# Patient Record
Sex: MALE | Race: WHITE | NOT HISPANIC OR LATINO | Employment: FULL TIME | URBAN - METROPOLITAN AREA
[De-identification: names, ages, dates, MRNs, and addresses within clinical notes are randomized per-mention and may not be internally consistent; named-entity substitution may affect disease eponyms.]

---

## 2023-02-15 PROBLEM — R30.0 DYSURIA: Status: ACTIVE | Noted: 2023-02-15

## 2023-02-15 PROBLEM — F41.9 ANXIETY: Status: ACTIVE | Noted: 2023-02-15

## 2023-02-15 PROBLEM — R21 RASH: Status: ACTIVE | Noted: 2023-02-15

## 2023-02-15 PROBLEM — F79 INTELLECTUAL DISABILITY: Status: ACTIVE | Noted: 2023-02-15

## 2023-02-15 PROBLEM — H61.23 BILATERAL IMPACTED CERUMEN: Status: ACTIVE | Noted: 2023-02-15

## 2023-02-15 PROBLEM — R35.0 URINARY FREQUENCY: Status: ACTIVE | Noted: 2023-02-15

## 2023-02-15 PROBLEM — G24.01 TARDIVE DYSKINESIA: Status: ACTIVE | Noted: 2023-02-15

## 2023-02-15 PROBLEM — J30.9 ALLERGIC RHINITIS: Status: ACTIVE | Noted: 2023-02-15

## 2023-02-15 PROBLEM — K59.00 CONSTIPATION: Status: ACTIVE | Noted: 2023-02-15

## 2023-02-15 PROBLEM — R91.1 LUNG NODULE: Status: ACTIVE | Noted: 2023-02-15

## 2023-02-15 PROBLEM — R06.02 EXERTIONAL SHORTNESS OF BREATH: Status: ACTIVE | Noted: 2023-02-15

## 2023-02-15 PROBLEM — K21.9 GASTROESOPHAGEAL REFLUX DISEASE WITHOUT ESOPHAGITIS: Status: ACTIVE | Noted: 2023-02-15

## 2023-02-15 PROBLEM — U07.1 COVID-19 VIRUS INFECTION: Status: ACTIVE | Noted: 2023-02-15

## 2023-02-15 PROBLEM — R13.10 DYSPHAGIA: Status: ACTIVE | Noted: 2023-02-15

## 2023-02-15 PROBLEM — R55 SYNCOPE AND COLLAPSE: Status: ACTIVE | Noted: 2023-02-15

## 2023-02-15 PROBLEM — G47.33 OBSTRUCTIVE SLEEP APNEA: Status: ACTIVE | Noted: 2023-02-15

## 2023-02-15 PROBLEM — G21.19 DRUG-INDUCED PARKINSONISM (MULTI): Status: ACTIVE | Noted: 2023-02-15

## 2023-02-15 PROBLEM — G47.00 INSOMNIA: Status: ACTIVE | Noted: 2023-02-15

## 2023-02-15 PROBLEM — E78.5 HYPERLIPIDEMIA: Status: ACTIVE | Noted: 2023-02-15

## 2023-02-15 PROBLEM — F42.9 OBSESSIVE COMPULSIVE DISORDER: Status: ACTIVE | Noted: 2023-02-15

## 2023-02-15 PROBLEM — Q90.9 DOWN SYNDROME (HHS-HCC): Status: ACTIVE | Noted: 2023-02-15

## 2023-02-15 PROBLEM — E03.9 HYPOTHYROIDISM: Status: ACTIVE | Noted: 2023-02-15

## 2023-02-15 PROBLEM — R03.1 BORDERLINE LOW BLOOD PRESSURE DETERMINED BY EXAMINATION: Status: ACTIVE | Noted: 2023-02-15

## 2023-02-15 PROBLEM — G24.3 CERVICAL DYSTONIA: Status: ACTIVE | Noted: 2023-02-15

## 2023-02-15 RX ORDER — LORATADINE 10 MG/1
TABLET ORAL
COMMUNITY
Start: 2021-09-08 | End: 2023-11-09 | Stop reason: WASHOUT

## 2023-02-15 RX ORDER — MAG HYDROX/ALUMINUM HYD/SIMETH 200-200-20
SUSPENSION, ORAL (FINAL DOSE FORM) ORAL
COMMUNITY
Start: 2022-03-21 | End: 2023-11-09 | Stop reason: WASHOUT

## 2023-02-15 RX ORDER — CLONAZEPAM 0.5 MG/1
TABLET ORAL
COMMUNITY
Start: 2022-03-16 | End: 2023-11-09 | Stop reason: SDUPTHER

## 2023-02-15 RX ORDER — VALBENAZINE 60 MG/1
CAPSULE ORAL
COMMUNITY
Start: 2021-07-19 | End: 2023-03-29 | Stop reason: ALTCHOICE

## 2023-02-15 RX ORDER — OMEPRAZOLE 40 MG/1
CAPSULE, DELAYED RELEASE ORAL
COMMUNITY
Start: 2021-07-19 | End: 2023-07-27 | Stop reason: SDUPTHER

## 2023-02-15 RX ORDER — KETOCONAZOLE 20 MG/ML
SHAMPOO, SUSPENSION TOPICAL
COMMUNITY
Start: 2022-03-21

## 2023-02-15 RX ORDER — MELATONIN 5 MG
CAPSULE ORAL
COMMUNITY
Start: 2021-08-07 | End: 2023-05-17 | Stop reason: ALTCHOICE

## 2023-02-15 RX ORDER — LEVOTHYROXINE SODIUM 112 UG/1
TABLET ORAL
COMMUNITY
Start: 2020-02-18 | End: 2023-03-22

## 2023-02-15 RX ORDER — ASPIRIN 81 MG
TABLET, DELAYED RELEASE (ENTERIC COATED) ORAL
COMMUNITY
Start: 2021-08-27 | End: 2023-03-29 | Stop reason: ALTCHOICE

## 2023-02-15 RX ORDER — VIT C/E/ZN/COPPR/LUTEIN/ZEAXAN 250MG-90MG
CAPSULE ORAL
COMMUNITY
Start: 2021-07-19 | End: 2023-07-24

## 2023-02-15 RX ORDER — CARBIDOPA AND LEVODOPA 25; 100 MG/1; MG/1
TABLET ORAL
COMMUNITY
Start: 2021-08-30 | End: 2023-03-29 | Stop reason: ALTCHOICE

## 2023-02-15 RX ORDER — TRIAMCINOLONE ACETONIDE 1 MG/G
CREAM TOPICAL
COMMUNITY
Start: 2021-09-09 | End: 2023-03-29 | Stop reason: ALTCHOICE

## 2023-02-15 RX ORDER — ASPIRIN 81 MG
TABLET, DELAYED RELEASE (ENTERIC COATED) ORAL
COMMUNITY
Start: 2021-08-16 | End: 2023-05-17 | Stop reason: ALTCHOICE

## 2023-02-15 RX ORDER — FAMOTIDINE 20 MG/1
TABLET, FILM COATED ORAL
COMMUNITY
Start: 2021-07-19 | End: 2023-08-10 | Stop reason: SDUPTHER

## 2023-02-15 RX ORDER — FLUOXETINE HYDROCHLORIDE 60 MG/1
TABLET, FILM COATED ORAL; ORAL
COMMUNITY
Start: 2021-07-29 | End: 2023-10-02

## 2023-02-15 RX ORDER — CYANOCOBALAMIN (VITAMIN B-12) 250 MCG
TABLET ORAL
COMMUNITY
Start: 2021-07-19 | End: 2023-05-03

## 2023-03-10 DIAGNOSIS — R13.10 DYSPHAGIA, UNSPECIFIED TYPE: ICD-10-CM

## 2023-03-10 DIAGNOSIS — R53.81 PHYSICAL DECONDITIONING: Primary | ICD-10-CM

## 2023-03-20 DIAGNOSIS — E03.9 HYPOTHYROIDISM, UNSPECIFIED: ICD-10-CM

## 2023-03-22 RX ORDER — LEVOTHYROXINE SODIUM 112 UG/1
TABLET ORAL
Qty: 90 TABLET | Refills: 1 | Status: SHIPPED | OUTPATIENT
Start: 2023-03-22 | End: 2023-03-30

## 2023-03-29 ENCOUNTER — OFFICE VISIT (OUTPATIENT)
Dept: PRIMARY CARE | Facility: CLINIC | Age: 48
End: 2023-03-29
Payer: MEDICARE

## 2023-03-29 ENCOUNTER — LAB (OUTPATIENT)
Dept: LAB | Facility: LAB | Age: 48
End: 2023-03-29
Payer: MEDICARE

## 2023-03-29 VITALS
OXYGEN SATURATION: 99 % | BODY MASS INDEX: 19.91 KG/M2 | SYSTOLIC BLOOD PRESSURE: 80 MMHG | DIASTOLIC BLOOD PRESSURE: 52 MMHG | WEIGHT: 116 LBS | HEART RATE: 65 BPM

## 2023-03-29 DIAGNOSIS — E03.9 HYPOTHYROIDISM, UNSPECIFIED TYPE: ICD-10-CM

## 2023-03-29 DIAGNOSIS — E03.9 HYPOTHYROIDISM, UNSPECIFIED TYPE: Primary | ICD-10-CM

## 2023-03-29 DIAGNOSIS — K59.04 CHRONIC IDIOPATHIC CONSTIPATION: ICD-10-CM

## 2023-03-29 LAB — THYROTROPIN (MIU/L) IN SER/PLAS BY DETECTION LIMIT <= 0.05 MIU/L: 11.27 MIU/L (ref 0.44–3.98)

## 2023-03-29 PROCEDURE — 84443 ASSAY THYROID STIM HORMONE: CPT

## 2023-03-29 PROCEDURE — 3008F BODY MASS INDEX DOCD: CPT | Performed by: INTERNAL MEDICINE

## 2023-03-29 PROCEDURE — 36415 COLL VENOUS BLD VENIPUNCTURE: CPT

## 2023-03-29 PROCEDURE — 99214 OFFICE O/P EST MOD 30 MIN: CPT | Performed by: INTERNAL MEDICINE

## 2023-03-29 RX ORDER — MIDODRINE HYDROCHLORIDE 5 MG/1
10 TABLET ORAL 3 TIMES DAILY
COMMUNITY
End: 2023-11-09 | Stop reason: WASHOUT

## 2023-03-29 RX ORDER — DOCUSATE SODIUM 50 MG/5ML
100 LIQUID ORAL 2 TIMES DAILY
Qty: 600 ML | Refills: 11 | Status: SHIPPED | OUTPATIENT
Start: 2023-03-29 | End: 2023-04-08

## 2023-03-29 NOTE — PROGRESS NOTES
Subjective   Patient ID: Jalen Blackwell is a 47 y.o. male who presents for Follow-up. Check peg wound     HPI   Patient admitted to hospital 2/4-2/10 for aspiration pneumonia. He was treated with Unasyn and evaluated by SLP. He had PEG tube placed and eventually discharged to rehab for about 1 month    He currently has PEG tube in place (gets 3 bolus feeds daily). Eating  softs and honey thick liquids. Continues to work with SLP and PT. Follows with Dr. Gamboa at [facility]. No longer taking Ingrezza or carb/levo. He is getting Botox injections in his neck.       Review of Systems   All other systems reviewed and are negative.      Objective   BP 80/52   Pulse 65   Wt 52.6 kg (116 lb)   SpO2 99%   BMI 19.91 kg/m²     Physical Exam  Constitutional:       Appearance: Normal appearance.      Comments: Developmentally delayed    HENT:      Head: Normocephalic and atraumatic.   Cardiovascular:      Rate and Rhythm: Normal rate and regular rhythm.      Heart sounds: Normal heart sounds. No murmur heard.     No gallop.   Pulmonary:      Effort: Pulmonary effort is normal. No respiratory distress.      Breath sounds: No wheezing or rales.   Skin:     General: Skin is warm and dry.      Findings: No rash.      Comments: PEG tube in place- has erythema around the bumped without any open wounds    Neurological:      Mental Status: He is alert.         Assessment/Plan         #Anxiety   -Following with psych, cont current meds     #Aspiration pneumonia   -now s/p PEG. Cont SLP - diet regimen per their recs     #Chronic hypotension   -Following with Dr. Baker . Cont Midodrine 10mg TID     #GERD  -Sx stable. Recent EGD 7/16/21 with reflux esophagitis (no Barretts on path). Cont PPI BID, famotidine qhs and Gaviscon.     #Tardive dyskinesia, drug induced parkinsonism   -Following with neurology at [facility]     #hypothyroidism   -Cont levothyroxine, check TSH today      #Constipation   -Rx docusate 100mg BID liquid      Influenza:  UTD  COVID: x2   Prevnar 13: Never  Pneumovax 23: Never  Shingrix: Never   CRC: FIT At age 51 per GI   PSA: Never   Lung ca screening: NI   AAA: NI         RTC 6 mo

## 2023-03-30 DIAGNOSIS — E03.9 HYPOTHYROIDISM, UNSPECIFIED TYPE: Primary | ICD-10-CM

## 2023-03-30 RX ORDER — LEVOTHYROXINE SODIUM 125 UG/1
125 TABLET ORAL
Qty: 90 TABLET | Refills: 1 | Status: SHIPPED | OUTPATIENT
Start: 2023-03-30 | End: 2023-06-28

## 2023-04-04 LAB
6-ACETYLMORPHINE: <25 NG/ML
7-AMINOCLONAZEPAM: 654 NG/ML
ALPHA-HYDROXYALPRAZOLAM: <25 NG/ML
ALPHA-HYDROXYMIDAZOLAM: <25 NG/ML
ALPRAZOLAM: <25 NG/ML
AMPHETAMINE (PRESENCE) IN URINE BY SCREEN METHOD: ABNORMAL
BARBITURATES PRESENCE IN URINE BY SCREEN METHOD: ABNORMAL
CANNABINOIDS IN URINE BY SCREEN METHOD: ABNORMAL
CHLORDIAZEPOXIDE: <25 NG/ML
CLONAZEPAM: <25 NG/ML
COCAINE (PRESENCE) IN URINE BY SCREEN METHOD: ABNORMAL
CODEINE: <50 NG/ML
CREATINE, URINE FOR DRUG: 85.4 MG/DL
DIAZEPAM: <25 NG/ML
DRUG SCREEN COMMENT URINE: ABNORMAL
EDDP: <25 NG/ML
FENTANYL CONFIRMATION, URINE: <2.5 NG/ML
HYDROCODONE: <25 NG/ML
HYDROMORPHONE: <25 NG/ML
LORAZEPAM: <25 NG/ML
METHADONE CONFIRMATION,URINE: <25 NG/ML
MIDAZOLAM: <25 NG/ML
MORPHINE URINE: <50 NG/ML
NORDIAZEPAM: <25 NG/ML
NORFENTANYL: <2.5 NG/ML
NORHYDROCODONE: <25 NG/ML
NOROXYCODONE: <25 NG/ML
O-DESMETHYLTRAMADOL: <50 NG/ML
OXAZEPAM: <25 NG/ML
OXYCODONE: <25 NG/ML
OXYMORPHONE: <25 NG/ML
PHENCYCLIDINE (PRESENCE) IN URINE BY SCREEN METHOD: ABNORMAL
TEMAZEPAM: <25 NG/ML
TRAMADOL: <50 NG/ML
ZOLPIDEM METABOLITE (ZCA): <25 NG/ML
ZOLPIDEM: <25 NG/ML

## 2023-04-18 ENCOUNTER — TELEPHONE (OUTPATIENT)
Dept: PRIMARY CARE | Facility: CLINIC | Age: 48
End: 2023-04-18
Payer: MEDICARE

## 2023-04-18 DIAGNOSIS — T14.8XXA OPEN WOUND OF SKIN: Primary | ICD-10-CM

## 2023-04-30 DIAGNOSIS — Z00.00 ENCOUNTER FOR GENERAL ADULT MEDICAL EXAMINATION WITHOUT ABNORMAL FINDINGS: ICD-10-CM

## 2023-05-03 RX ORDER — CYANOCOBALAMIN (VITAMIN B-12) 250 MCG
TABLET ORAL
Qty: 90 TABLET | Refills: 3 | Status: SHIPPED | OUTPATIENT
Start: 2023-05-03 | End: 2024-04-12

## 2023-05-03 RX ORDER — MULTIVITAMIN WITH FOLIC ACID 400 MCG
TABLET ORAL
Qty: 90 EACH | Refills: 3 | Status: SHIPPED | OUTPATIENT
Start: 2023-05-03 | End: 2023-11-09 | Stop reason: WASHOUT

## 2023-05-03 NOTE — TELEPHONE ENCOUNTER
Requested Prescriptions     Pending Prescriptions Disp Refills    Daily-Yovanny, with folic acid, 400 mcg tablet [Pharmacy Med Name: DAILY-YOVANNY TABLET] 90 each 1     Sig: TAKE 1 TABLET BY MOUTH EVERY DAY    cyanocobalamin (Vitamin B-12) 250 mcg tablet [Pharmacy Med Name: VITAMIN B-12 250 MCG TABLET] 90 tablet 1     Sig: TAKE 1 TABLET BY MOUTH EVERY DAY AS DIRECTED

## 2023-05-11 DIAGNOSIS — G24.3 CERVICAL DYSTONIA: ICD-10-CM

## 2023-05-17 ENCOUNTER — OFFICE VISIT (OUTPATIENT)
Dept: PRIMARY CARE | Facility: CLINIC | Age: 48
End: 2023-05-17
Payer: MEDICARE

## 2023-05-17 VITALS
DIASTOLIC BLOOD PRESSURE: 51 MMHG | HEART RATE: 67 BPM | OXYGEN SATURATION: 100 % | SYSTOLIC BLOOD PRESSURE: 96 MMHG | BODY MASS INDEX: 22.26 KG/M2 | HEIGHT: 62 IN | WEIGHT: 121 LBS

## 2023-05-17 DIAGNOSIS — R05.1 ACUTE COUGH: Primary | ICD-10-CM

## 2023-05-17 PROCEDURE — 1036F TOBACCO NON-USER: CPT | Performed by: INTERNAL MEDICINE

## 2023-05-17 PROCEDURE — 3008F BODY MASS INDEX DOCD: CPT | Performed by: INTERNAL MEDICINE

## 2023-05-17 PROCEDURE — 99213 OFFICE O/P EST LOW 20 MIN: CPT | Performed by: INTERNAL MEDICINE

## 2023-05-17 RX ORDER — CETIRIZINE HYDROCHLORIDE 10 MG/1
10 TABLET ORAL DAILY
Qty: 90 TABLET | Refills: 1 | Status: SHIPPED | OUTPATIENT
Start: 2023-05-17 | End: 2023-11-13

## 2023-05-17 RX ORDER — SODIUM CHLORIDE 1 G/1
1 TABLET ORAL 2 TIMES DAILY
COMMUNITY
Start: 2023-05-08 | End: 2023-10-02

## 2023-05-17 RX ORDER — FLUTICASONE PROPIONATE 50 MCG
1 SPRAY, SUSPENSION (ML) NASAL DAILY
Qty: 48 G | Refills: 1 | Status: SHIPPED | OUTPATIENT
Start: 2023-05-17 | End: 2023-07-24 | Stop reason: ALTCHOICE

## 2023-05-17 RX ORDER — DOCUSATE SODIUM 50 MG/5ML
50 LIQUID ORAL DAILY
COMMUNITY
Start: 2023-05-05 | End: 2024-03-29 | Stop reason: SDUPTHER

## 2023-05-17 RX ORDER — CLOBETASOL PROPIONATE 0.46 MG/ML
0.05 SOLUTION TOPICAL 2 TIMES DAILY
COMMUNITY
Start: 2023-04-28

## 2023-05-17 ASSESSMENT — PATIENT HEALTH QUESTIONNAIRE - PHQ9
SUM OF ALL RESPONSES TO PHQ9 QUESTIONS 1 AND 2: 0
1. LITTLE INTEREST OR PLEASURE IN DOING THINGS: NOT AT ALL
2. FEELING DOWN, DEPRESSED OR HOPELESS: NOT AT ALL

## 2023-05-17 NOTE — PROGRESS NOTES
"Subjective   Patient ID: Jalen Blackwell is a 47 y.o. male who presents for No chief complaint on file..    HPI   Reports increasing dry cough in the middle of night x few weeks  Not coughing when he eats   Still get TF TID   Not much runny nose, itchy or watery eyes  GERD Seems well controlled   Has not taken    Review of Systems   HENT:  Positive for hearing loss.        Objective   BP 96/51   Pulse 67   Ht 1.575 m (5' 2\")   Wt 54.9 kg (121 lb)   SpO2 100%   BMI 22.13 kg/m²     Physical Exam  Constitutional:       Appearance: Normal appearance.      Comments: Developmental delay    HENT:      Head: Normocephalic and atraumatic.      Right Ear: Tympanic membrane and ear canal normal.      Left Ear: Tympanic membrane and ear canal normal.   Cardiovascular:      Rate and Rhythm: Normal rate and regular rhythm.      Heart sounds: Normal heart sounds. No murmur heard.     No gallop.   Pulmonary:      Effort: Pulmonary effort is normal. No respiratory distress.      Breath sounds: No wheezing or rales.   Abdominal:      General: Abdomen is flat. There is no distension.      Comments: PEG in place with binder    Skin:     General: Skin is warm and dry.      Findings: No rash.   Neurological:      Mental Status: He is alert and oriented to person, place, and time.         Assessment/Plan       #Cough   -Suspect due to PND/seasonal allergies. Due to h/o aspiration pneumonia will CXR r/o aspiration. GERD seems well controlled.   -Rx cetirizine 10mg nightly and fluticasone nasal spray nightly         "

## 2023-06-26 DIAGNOSIS — E03.9 HYPOTHYROIDISM, UNSPECIFIED TYPE: ICD-10-CM

## 2023-06-28 RX ORDER — LEVOTHYROXINE SODIUM 125 UG/1
125 TABLET ORAL
Qty: 90 TABLET | Refills: 1 | Status: SHIPPED | OUTPATIENT
Start: 2023-06-28 | End: 2024-03-15

## 2023-07-05 ENCOUNTER — TELEPHONE (OUTPATIENT)
Dept: PRIMARY CARE | Facility: CLINIC | Age: 48
End: 2023-07-05

## 2023-07-05 ENCOUNTER — LAB (OUTPATIENT)
Dept: LAB | Facility: LAB | Age: 48
End: 2023-07-05
Payer: MEDICARE

## 2023-07-05 DIAGNOSIS — E03.9 HYPOTHYROIDISM, UNSPECIFIED TYPE: ICD-10-CM

## 2023-07-05 PROCEDURE — 36415 COLL VENOUS BLD VENIPUNCTURE: CPT

## 2023-07-05 PROCEDURE — 84443 ASSAY THYROID STIM HORMONE: CPT

## 2023-07-05 NOTE — TELEPHONE ENCOUNTER
Current Outpatient Medications on File Prior to Visit   Medication Sig Dispense Refill    cetirizine (ZyrTEC) 10 mg tablet Take 1 tablet (10 mg) by mouth once daily. 90 tablet 1    cholecalciferol (Vitamin D-3) 25 MCG (1000 UT) capsule TAKE 1 CAPSULE Daily      clobetasol (Temovate) 0.05 % external solution Apply 0.05 Applications topically 2 times a day.      clonazePAM (KlonoPIN) 0.5 mg tablet Take 1/2 tablet at 7:30am, 1/2 tablet at 2:30pm , and 1/2 tablet at 8:30pm      cyanocobalamin (Vitamin B-12) 250 mcg tablet TAKE 1 TABLET BY MOUTH EVERY DAY AS DIRECTED 90 tablet 3    docusate sodium (Colace) 50 mg/5 mL oral liquid Take 5 mL (50 mg) by mouth once daily.      famotidine (Pepcid) 20 mg tablet TAKE 1 TABLET AT BEDTIME AS NEEDED      FLUoxetine (PROzac) 60 mg tablet       fluticasone (Flonase) 50 mcg/actuation nasal spray Administer 1 spray into each nostril once daily. Shake gently. Before first use, prime pump. After use, clean tip and replace cap. 48 g 1    hydrocortisone 1 % ointment APPLY  AND RUB  IN A THIN FILM TO AFFECTED AREAS TWICE DAILY.(AM AND PM).      ketoconazole (NIZOral) 2 % shampoo APPLY TO SCALP AS DIRECTED.      levothyroxine (Synthroid, Levoxyl) 125 mcg tablet TAKE 1 TABLET (125 MCG) BY MOUTH ONCE DAILY IN THE MORNING. TAKE BEFORE MEALS. 90 tablet 1    loratadine (Claritin) 10 mg tablet TAKE 1 TABLET BY MOUTH AT  BEDTIME prn      midodrine (Proamatine) 5 mg tablet Take 2 tablets (10 mg) by mouth 3 times a day.      multivitamin with folic acid (Daily-Ricardo, with folic acid,) 400 mcg tablet TAKE 1 TABLET BY MOUTH EVERY DAY 90 each 3    omeprazole (PriLOSEC) 40 mg DR capsule TAKE ONE CAPSULE BY MOUTH TWO TIMES A DAY; 30 MINUTES BEFORE BREAKFAST AND DINNER      psyllium (Metamucil) powder Take 2 teaspoons in 8 oz. Liquid daily in the morning- one month supply      sodium chloride 1,000 mg tablet Take 1 tablet (1 g) by mouth 2 times a day.       No current facility-administered medications on  file prior to visit.

## 2023-07-06 LAB — THYROTROPIN (MIU/L) IN SER/PLAS BY DETECTION LIMIT <= 0.05 MIU/L: 1.72 MIU/L (ref 0.44–3.98)

## 2023-07-24 ENCOUNTER — OFFICE VISIT (OUTPATIENT)
Dept: PRIMARY CARE | Facility: CLINIC | Age: 48
End: 2023-07-24
Payer: MEDICARE

## 2023-07-24 VITALS
BODY MASS INDEX: 21.97 KG/M2 | HEART RATE: 62 BPM | OXYGEN SATURATION: 99 % | DIASTOLIC BLOOD PRESSURE: 64 MMHG | SYSTOLIC BLOOD PRESSURE: 105 MMHG | HEIGHT: 63 IN | WEIGHT: 124 LBS

## 2023-07-24 DIAGNOSIS — R79.89 OTHER SPECIFIED ABNORMAL FINDINGS OF BLOOD CHEMISTRY: ICD-10-CM

## 2023-07-24 DIAGNOSIS — G24.01 TARDIVE DYSKINESIA: ICD-10-CM

## 2023-07-24 DIAGNOSIS — E78.5 HYPERLIPIDEMIA, UNSPECIFIED HYPERLIPIDEMIA TYPE: ICD-10-CM

## 2023-07-24 DIAGNOSIS — R26.2 DIFFICULTY IN WALKING: Primary | ICD-10-CM

## 2023-07-24 DIAGNOSIS — R05.1 ACUTE COUGH: ICD-10-CM

## 2023-07-24 PROCEDURE — 99214 OFFICE O/P EST MOD 30 MIN: CPT | Performed by: INTERNAL MEDICINE

## 2023-07-24 PROCEDURE — 3008F BODY MASS INDEX DOCD: CPT | Performed by: INTERNAL MEDICINE

## 2023-07-24 PROCEDURE — 1036F TOBACCO NON-USER: CPT | Performed by: INTERNAL MEDICINE

## 2023-07-24 RX ORDER — VIT C/E/ZN/COPPR/LUTEIN/ZEAXAN 250MG-90MG
25 CAPSULE ORAL DAILY
Qty: 90 CAPSULE | Refills: 1 | Status: SHIPPED | OUTPATIENT
Start: 2023-07-24 | End: 2023-10-02 | Stop reason: ALTCHOICE

## 2023-07-24 RX ORDER — METHYLPREDNISOLONE 4 MG/1
TABLET ORAL
Qty: 21 TABLET | Refills: 0 | Status: SHIPPED | OUTPATIENT
Start: 2023-07-24 | End: 2023-07-31

## 2023-07-24 NOTE — PATIENT INSTRUCTIONS
Please complete fasting blood work. Fast for 10 hours, black coffee and water the morning of labs are OK.     Take medrol pack, if cough does not improve will refer to pulmonology     I will complete wheelchair paperwork

## 2023-07-24 NOTE — PROGRESS NOTES
"Subjective   Patient ID: Jalen Blackwell is a 48 y.o. male who presents for Follow-up (EVALUATION FOR WHEEL CHAIR) and Cough ([id number] ).    HPI   Patient has h/o down syndrome, tardive dyskinesia, cervical dystonia. He has difficulty ambulating due to posture issus. Tends to leans backwards has fallen numerous times. He can not walk or stand for long periods without a wheelchair. Patient's tardive dyskinesia as created instability in his cervical spine requiring neck support. Patient has weakness in b/l LE and b/l UE, has been attending PT with mild improvement. He can not continue using a rollator due to arm weakness and increased fall risk.      Review of Systems   All other systems reviewed and are negative.      Objective   /64   Pulse 62   Ht 1.6 m (5' 3\")   Wt 56.2 kg (124 lb)   SpO2 99%   BMI 21.97 kg/m²     Physical Exam  Constitutional:       Appearance: Normal appearance.      Comments: Development delay    Cardiovascular:      Rate and Rhythm: Normal rate and regular rhythm.      Heart sounds: No murmur heard.  Pulmonary:      Effort: Pulmonary effort is normal.      Breath sounds: Normal breath sounds. No wheezing.   Abdominal:      General: Abdomen is flat.      Palpations: Abdomen is soft.      Tenderness: There is no abdominal tenderness.      Comments: PEG in place    Musculoskeletal:      Right lower leg: No edema.      Left lower leg: No edema.   Skin:     General: Skin is warm and dry.      Findings: No rash.   Neurological:      General: No focal deficit present.      Mental Status: He is alert.      Sensory: No sensory deficit.      Motor: Weakness present.      Comments: B/l UE and LE weakness    Psychiatric:      Comments: Mood and affect are at baseline          Assessment/Plan       Patient has h/o down syndrome, tardive dyskinesia, cervical dystonia. He has difficulty ambulating due to posture issus. Tends to leans backwards has fallen numerous times. He can not walk or stand for " long periods without a wheelchair. Patient's tardive dyskinesia as created instability in his cervical spine requiring neck support. Patient has weakness in b/l LE and b/l UE, has been attending PT with mild improvement. He can not continue using a rollator due to arm weakness and increased fall risk. It is my recommendation that he is provided a wheelchair with neck support to meet his mobility and neck stability needs

## 2023-07-27 DIAGNOSIS — K21.9 GASTROESOPHAGEAL REFLUX DISEASE WITHOUT ESOPHAGITIS: ICD-10-CM

## 2023-07-27 RX ORDER — OMEPRAZOLE 40 MG/1
40 CAPSULE, DELAYED RELEASE ORAL
Qty: 180 CAPSULE | Refills: 1 | Status: SHIPPED | OUTPATIENT
Start: 2023-07-27 | End: 2023-10-02

## 2023-07-27 NOTE — TELEPHONE ENCOUNTER
Requested Prescriptions     Pending Prescriptions Disp Refills    omeprazole (PriLOSEC) 40 mg DR capsule 180 capsule 1     Sig: Take 1 capsule (40 mg) by mouth 2 times a day before meals. Take 30 minutes before breakfast and dinner

## 2023-07-31 ENCOUNTER — DOCUMENTATION (OUTPATIENT)
Dept: PRIMARY CARE | Facility: CLINIC | Age: 48
End: 2023-07-31
Payer: MEDICARE

## 2023-07-31 PROBLEM — M79.674 PAIN IN TOES OF BOTH FEET: Status: ACTIVE | Noted: 2021-05-11

## 2023-07-31 PROBLEM — M79.89 SWELLING OF LOWER EXTREMITY: Status: ACTIVE | Noted: 2023-07-31

## 2023-07-31 PROBLEM — M79.675 PAIN IN TOES OF BOTH FEET: Status: ACTIVE | Noted: 2021-05-11

## 2023-07-31 PROBLEM — B35.1 ONYCHOMYCOSIS: Status: ACTIVE | Noted: 2021-05-11

## 2023-07-31 NOTE — PROGRESS NOTES
Please see problem list.   Patient Active Problem List   Diagnosis    Obsessive compulsive disorder    Allergic rhinitis    Anxiety    Bilateral impacted cerumen    Borderline low blood pressure determined by examination    Cervical dystonia    Constipation    COVID-19 virus infection    Down syndrome    Drug-induced Parkinsonism (CMS/HCC)    Dysphagia    Dysuria    Exertional shortness of breath    Gastroesophageal reflux disease without esophagitis    Hyperlipidemia    Hypothyroidism    Insomnia    Intellectual disability    Lung nodule    Obstructive sleep apnea    Rash    Syncope and collapse    Tardive dyskinesia    Urinary frequency    Impacted cerumen    Moderate episode of recurrent major depressive disorder (CMS/HCC)    Onychomycosis    Pain in toes of both feet    Swelling of lower extremity

## 2023-08-07 DIAGNOSIS — R05.1 ACUTE COUGH: ICD-10-CM

## 2023-08-07 NOTE — PROGRESS NOTES
Orders Placed This Encounter   Procedures    Referral to Pulmonology     Standing Status:   Future     Standing Expiration Date:   2/7/2024     Referral Priority:   Routine     Referral Type:   Consultation     Referral Reason:   Specialty Services Required     Requested Specialty:   Pulmonary Disease     Number of Visits Requested:   1

## 2023-08-10 DIAGNOSIS — K21.9 GASTROESOPHAGEAL REFLUX DISEASE WITHOUT ESOPHAGITIS: ICD-10-CM

## 2023-08-10 RX ORDER — FAMOTIDINE 20 MG/1
TABLET, FILM COATED ORAL
Qty: 90 TABLET | Refills: 1 | Status: SHIPPED | OUTPATIENT
Start: 2023-08-10 | End: 2024-02-04

## 2023-08-10 NOTE — TELEPHONE ENCOUNTER
Requested Prescriptions     Pending Prescriptions Disp Refills    famotidine (Pepcid) 20 mg tablet 90 tablet 1     Sig: TAKE 1 TABLET AT BEDTIME AS NEEDED

## 2023-09-08 PROBLEM — G24.01 TARDIVE DYSTONIA: Status: ACTIVE | Noted: 2023-09-08

## 2023-09-08 PROBLEM — F42.9 OBSESSIVE-COMPULSIVE DISORDER: Status: ACTIVE | Noted: 2023-09-08

## 2023-09-08 PROBLEM — R79.89 LOW VITAMIN D LEVEL: Status: ACTIVE | Noted: 2023-09-08

## 2023-09-08 RX ORDER — TRIAMCINOLONE ACETONIDE 1 MG/G
1 CREAM TOPICAL 2 TIMES DAILY
COMMUNITY
Start: 2021-09-09

## 2023-09-08 RX ORDER — HYDROCORTISONE 25 MG/G
CREAM TOPICAL 2 TIMES DAILY
COMMUNITY
Start: 2023-07-24

## 2023-09-08 RX ORDER — MAG HYDROX/ALUMINUM HYD/SIMETH 200-200-20
1 SUSPENSION, ORAL (FINAL DOSE FORM) ORAL 2 TIMES DAILY
COMMUNITY
Start: 2022-03-21 | End: 2024-01-02 | Stop reason: WASHOUT

## 2023-09-08 RX ORDER — VIT C/E/ZN/COPPR/LUTEIN/ZEAXAN 250MG-90MG
1 CAPSULE ORAL DAILY
COMMUNITY
Start: 2022-12-16 | End: 2023-11-13 | Stop reason: ALTCHOICE

## 2023-09-08 RX ORDER — ERYTHROMYCIN 5 MG/G
OINTMENT OPHTHALMIC
COMMUNITY
Start: 2023-05-26 | End: 2024-01-02 | Stop reason: WASHOUT

## 2023-09-08 RX ORDER — MULTIVITAMIN WITH FOLIC ACID 400 MCG
1 TABLET ORAL DAILY
COMMUNITY
Start: 2022-12-16 | End: 2024-02-07 | Stop reason: SDUPTHER

## 2023-09-08 RX ORDER — LORATADINE 10 MG/1
TABLET ORAL
COMMUNITY
Start: 2021-09-08 | End: 2023-10-02

## 2023-09-08 RX ORDER — FLUOXETINE 20 MG/5ML
5 SOLUTION ORAL DAILY
COMMUNITY
Start: 2023-03-22 | End: 2023-11-09 | Stop reason: ALTCHOICE

## 2023-09-08 RX ORDER — LEVOTHYROXINE SODIUM 112 UG/1
1 TABLET ORAL DAILY
COMMUNITY
Start: 2020-02-18 | End: 2023-11-09 | Stop reason: WASHOUT

## 2023-09-08 RX ORDER — OMEPRAZOLE 40 MG/1
1 CAPSULE, DELAYED RELEASE ORAL DAILY
COMMUNITY
Start: 2021-07-19 | End: 2024-02-09 | Stop reason: SDUPTHER

## 2023-09-08 RX ORDER — VALBENAZINE 80 MG/1
CAPSULE ORAL
COMMUNITY
Start: 2023-01-23 | End: 2023-11-09 | Stop reason: WASHOUT

## 2023-09-08 RX ORDER — DOCUSATE SODIUM 100 MG/1
CAPSULE, LIQUID FILLED ORAL
COMMUNITY
Start: 2021-07-19 | End: 2023-10-02 | Stop reason: ALTCHOICE

## 2023-09-08 RX ORDER — SODIUM CHLORIDE 1000 MG
1 TABLET, SOLUBLE MISCELLANEOUS 2 TIMES DAILY
COMMUNITY
Start: 2023-04-04

## 2023-09-08 RX ORDER — ACETAMINOPHEN 500 MG
1 TABLET ORAL NIGHTLY
COMMUNITY
Start: 2022-08-05

## 2023-09-08 RX ORDER — FAMOTIDINE 20 MG/1
1 TABLET, FILM COATED ORAL NIGHTLY PRN
COMMUNITY
Start: 2021-07-19 | End: 2023-10-02 | Stop reason: ALTCHOICE

## 2023-09-08 RX ORDER — MIDODRINE HYDROCHLORIDE 10 MG/1
1 TABLET ORAL 3 TIMES DAILY
COMMUNITY
Start: 2023-03-28

## 2023-09-08 RX ORDER — FLUOXETINE HYDROCHLORIDE 40 MG/1
2 CAPSULE ORAL DAILY
COMMUNITY
Start: 2023-01-31 | End: 2023-11-09 | Stop reason: WASHOUT

## 2023-09-08 RX ORDER — CLONAZEPAM 0.5 MG/1
TABLET ORAL
COMMUNITY
Start: 2022-03-16 | End: 2023-10-02 | Stop reason: ALTCHOICE

## 2023-09-08 RX ORDER — CYANOCOBALAMIN (VITAMIN B-12) 250 MCG
1 TABLET ORAL DAILY
COMMUNITY
Start: 2021-11-10 | End: 2023-10-02 | Stop reason: ALTCHOICE

## 2023-10-02 ENCOUNTER — OFFICE VISIT (OUTPATIENT)
Dept: PRIMARY CARE | Facility: CLINIC | Age: 48
End: 2023-10-02
Payer: MEDICARE

## 2023-10-02 VITALS
DIASTOLIC BLOOD PRESSURE: 51 MMHG | SYSTOLIC BLOOD PRESSURE: 78 MMHG | HEIGHT: 62 IN | BODY MASS INDEX: 22.45 KG/M2 | OXYGEN SATURATION: 97 % | WEIGHT: 122 LBS | HEART RATE: 84 BPM

## 2023-10-02 DIAGNOSIS — Z23 NEED FOR INFLUENZA VACCINATION: ICD-10-CM

## 2023-10-02 DIAGNOSIS — I95.9 HYPOTENSION, UNSPECIFIED HYPOTENSION TYPE: ICD-10-CM

## 2023-10-02 DIAGNOSIS — E78.5 HYPERLIPIDEMIA, UNSPECIFIED HYPERLIPIDEMIA TYPE: ICD-10-CM

## 2023-10-02 DIAGNOSIS — Z00.00 ROUTINE GENERAL MEDICAL EXAMINATION AT HEALTH CARE FACILITY: Primary | ICD-10-CM

## 2023-10-02 DIAGNOSIS — Z12.5 SCREENING FOR PROSTATE CANCER: ICD-10-CM

## 2023-10-02 PROCEDURE — 99214 OFFICE O/P EST MOD 30 MIN: CPT | Performed by: INTERNAL MEDICINE

## 2023-10-02 PROCEDURE — 1036F TOBACCO NON-USER: CPT | Performed by: INTERNAL MEDICINE

## 2023-10-02 PROCEDURE — 90686 IIV4 VACC NO PRSV 0.5 ML IM: CPT | Performed by: INTERNAL MEDICINE

## 2023-10-02 PROCEDURE — G0008 ADMIN INFLUENZA VIRUS VAC: HCPCS | Performed by: INTERNAL MEDICINE

## 2023-10-02 PROCEDURE — 3008F BODY MASS INDEX DOCD: CPT | Performed by: INTERNAL MEDICINE

## 2023-10-02 PROCEDURE — G0439 PPPS, SUBSEQ VISIT: HCPCS | Performed by: INTERNAL MEDICINE

## 2023-10-02 ASSESSMENT — ACTIVITIES OF DAILY LIVING (ADL)
BATHING: NEEDS ASSISTANCE
MANAGING_FINANCES: TOTAL CARE
DOING_HOUSEWORK: TOTAL CARE
GROCERY_SHOPPING: TOTAL CARE
TAKING_MEDICATION: TOTAL CARE
DRESSING: NEEDS ASSISTANCE

## 2023-10-02 ASSESSMENT — PATIENT HEALTH QUESTIONNAIRE - PHQ9
2. FEELING DOWN, DEPRESSED OR HOPELESS: NOT AT ALL
1. LITTLE INTEREST OR PLEASURE IN DOING THINGS: NOT AT ALL
SUM OF ALL RESPONSES TO PHQ9 QUESTIONS 1 AND 2: 0

## 2023-10-02 NOTE — PROGRESS NOTES
Subjective   Patient ID: Jalen Blackwell is a 48 y.o. male who presents for Medicare Annual Wellness Visit Subsequent.    HPI     Continues to work with SLP to prevent aspiration, goal is to eventually remove the PEG.     BP is usually higher than today. Is taking midodrine and NaCl.     No other new issues     Review of Systems   All other systems reviewed and are negative.      Objective   There were no vitals taken for this visit.    Physical Exam  Constitutional:       Appearance: Normal appearance.      Comments: Has developmental delay    HENT:      Head: Normocephalic and atraumatic.   Cardiovascular:      Rate and Rhythm: Normal rate and regular rhythm.      Heart sounds: Normal heart sounds. No murmur heard.     No gallop.   Pulmonary:      Effort: Pulmonary effort is normal. No respiratory distress.      Breath sounds: No wheezing or rales.   Skin:     General: Skin is warm and dry.      Findings: No rash.   Neurological:      Mental Status: He is alert. Mental status is at baseline.   Psychiatric:         Mood and Affect: Mood normal.         Behavior: Behavior normal.      Comments: Has flat affect          Assessment/Plan       #Anxiety   -Following with psych, cont current meds      #Aspiration pneumonia   -now s/p PEG. Cont SLP - diet regimen per their recs      #Chronic hypotension   -Following with Dr. Baker . Cont Midodrine 10mg TID  -Check AM cortisol and DHEAS for possible adrenal insufficiency      #GERD  -Sx stable. Recent EGD 7/16/21 with reflux esophagitis (no Barretts on path). Cont PPI BID, famotidine qhs and Gaviscon.     #Tardive dyskinesia, drug induced parkinsonism   -Following with neurology at [facility]     #hypothyroidism   -Cont levothyroxine     #Constipation   -Rx docusate 100mg BID liquid      Influenza: 10/2/2023  COVID: x2   Prevnar 13: Never  Pneumovax 23: Never  Shingrix: Never   CRC: FIT At age 51 per GI   PSA: Order today due to family history of prostate ca   Lung ca screening:  NI   AAA: NI         RTC 6 mo

## 2023-10-02 NOTE — PATIENT INSTRUCTIONS
Get labs within 3 hours of waking up --fast for 10 hours please  No med changes     Come back in 6 months

## 2023-10-04 ENCOUNTER — LAB (OUTPATIENT)
Dept: LAB | Facility: LAB | Age: 48
End: 2023-10-04
Payer: MEDICARE

## 2023-10-04 DIAGNOSIS — I95.9 HYPOTENSION, UNSPECIFIED HYPOTENSION TYPE: ICD-10-CM

## 2023-10-04 DIAGNOSIS — Z12.5 SCREENING FOR PROSTATE CANCER: ICD-10-CM

## 2023-10-04 DIAGNOSIS — E03.9 HYPOTHYROIDISM, UNSPECIFIED TYPE: ICD-10-CM

## 2023-10-04 DIAGNOSIS — E78.5 HYPERLIPIDEMIA, UNSPECIFIED HYPERLIPIDEMIA TYPE: ICD-10-CM

## 2023-10-04 LAB
ALBUMIN SERPL BCP-MCNC: 3.7 G/DL (ref 3.4–5)
ALP SERPL-CCNC: 51 U/L (ref 33–120)
ALT SERPL W P-5'-P-CCNC: 17 U/L (ref 10–52)
ANION GAP SERPL CALC-SCNC: 13 MMOL/L (ref 10–20)
AST SERPL W P-5'-P-CCNC: 19 U/L (ref 9–39)
BILIRUB SERPL-MCNC: 0.5 MG/DL (ref 0–1.2)
BUN SERPL-MCNC: 11 MG/DL (ref 6–23)
CALCIUM SERPL-MCNC: 9.1 MG/DL (ref 8.6–10.6)
CHLORIDE SERPL-SCNC: 104 MMOL/L (ref 98–107)
CHOLEST SERPL-MCNC: 181 MG/DL (ref 0–199)
CHOLESTEROL/HDL RATIO: 4.8
CO2 SERPL-SCNC: 29 MMOL/L (ref 21–32)
CORTIS AM PEAK SERPL-MSCNC: 9.7 UG/DL (ref 5–20)
CREAT SERPL-MCNC: 0.87 MG/DL (ref 0.5–1.3)
DHEA-S SERPL-MCNC: 111 UG/DL (ref 95–530)
ERYTHROCYTE [DISTWIDTH] IN BLOOD BY AUTOMATED COUNT: 12.1 % (ref 11.5–14.5)
GFR SERPL CREATININE-BSD FRML MDRD: >90 ML/MIN/1.73M*2
GLUCOSE SERPL-MCNC: 103 MG/DL (ref 74–99)
HCT VFR BLD AUTO: 43.8 % (ref 41–52)
HDLC SERPL-MCNC: 37.4 MG/DL
HGB BLD-MCNC: 15 G/DL (ref 13.5–17.5)
LDLC SERPL CALC-MCNC: 117 MG/DL (ref 140–190)
MCH RBC QN AUTO: 34.1 PG (ref 26–34)
MCHC RBC AUTO-ENTMCNC: 34.2 G/DL (ref 32–36)
MCV RBC AUTO: 100 FL (ref 80–100)
NON HDL CHOLESTEROL: 144 MG/DL (ref 0–149)
NRBC BLD-RTO: 0 /100 WBCS (ref 0–0)
PLATELET # BLD AUTO: 164 X10*3/UL (ref 150–450)
PMV BLD AUTO: 10.5 FL (ref 7.5–11.5)
POTASSIUM SERPL-SCNC: 3.9 MMOL/L (ref 3.5–5.3)
PROT SERPL-MCNC: 6.7 G/DL (ref 6.4–8.2)
PSA SERPL-MCNC: 0.28 NG/ML
RBC # BLD AUTO: 4.4 X10*6/UL (ref 4.5–5.9)
SODIUM SERPL-SCNC: 142 MMOL/L (ref 136–145)
TRIGL SERPL-MCNC: 135 MG/DL (ref 0–149)
TSH SERPL-ACNC: 3.26 MIU/L (ref 0.44–3.98)
VLDL: 27 MG/DL (ref 0–40)
WBC # BLD AUTO: 4.2 X10*3/UL (ref 4.4–11.3)

## 2023-10-04 PROCEDURE — 36415 COLL VENOUS BLD VENIPUNCTURE: CPT

## 2023-10-09 ENCOUNTER — OFFICE VISIT (OUTPATIENT)
Dept: WOUND CARE | Facility: HOSPITAL | Age: 48
End: 2023-10-09
Payer: MEDICARE

## 2023-10-09 ENCOUNTER — TELEPHONE (OUTPATIENT)
Dept: PRIMARY CARE | Facility: CLINIC | Age: 48
End: 2023-10-09

## 2023-10-09 PROCEDURE — 17250 CHEM CAUT OF GRANLTJ TISSUE: CPT | Mod: PO

## 2023-10-16 DIAGNOSIS — R13.12 OROPHARYNGEAL DYSPHAGIA: Primary | ICD-10-CM

## 2023-10-23 ENCOUNTER — CLINICAL SUPPORT (OUTPATIENT)
Dept: WOUND CARE | Facility: HOSPITAL | Age: 48
End: 2023-10-23
Payer: MEDICARE

## 2023-10-23 PROCEDURE — 17250 CHEM CAUT OF GRANLTJ TISSUE: CPT | Mod: PO

## 2023-11-01 ENCOUNTER — LAB (OUTPATIENT)
Dept: LAB | Facility: LAB | Age: 48
End: 2023-11-01
Payer: MEDICARE

## 2023-11-01 DIAGNOSIS — R32 URINARY INCONTINENCE, UNSPECIFIED TYPE: ICD-10-CM

## 2023-11-01 PROCEDURE — 87086 URINE CULTURE/COLONY COUNT: CPT

## 2023-11-01 PROCEDURE — 81003 URINALYSIS AUTO W/O SCOPE: CPT

## 2023-11-01 NOTE — PROGRESS NOTES
Mother called   Jalen is experiencing night time urinary incontinence concerned for uti.  Ordered urine test. Next steps after we get results

## 2023-11-02 LAB
APPEARANCE UR: CLEAR
BILIRUB UR STRIP.AUTO-MCNC: NEGATIVE MG/DL
COLOR UR: YELLOW
GLUCOSE UR STRIP.AUTO-MCNC: NEGATIVE MG/DL
KETONES UR STRIP.AUTO-MCNC: NEGATIVE MG/DL
LEUKOCYTE ESTERASE UR QL STRIP.AUTO: NEGATIVE
NITRITE UR QL STRIP.AUTO: NEGATIVE
PH UR STRIP.AUTO: 6 [PH]
PROT UR STRIP.AUTO-MCNC: NEGATIVE MG/DL
RBC # UR STRIP.AUTO: NEGATIVE /UL
SP GR UR STRIP.AUTO: 1.01
UROBILINOGEN UR STRIP.AUTO-MCNC: <2 MG/DL

## 2023-11-03 LAB — BACTERIA UR CULT: NO GROWTH

## 2023-11-06 ENCOUNTER — CLINICAL SUPPORT (OUTPATIENT)
Dept: WOUND CARE | Facility: HOSPITAL | Age: 48
End: 2023-11-06
Payer: MEDICARE

## 2023-11-06 PROCEDURE — 17250 CHEM CAUT OF GRANLTJ TISSUE: CPT | Mod: PO

## 2023-11-07 ENCOUNTER — APPOINTMENT (OUTPATIENT)
Dept: SPEECH THERAPY | Facility: HOSPITAL | Age: 48
End: 2023-11-07
Payer: MEDICARE

## 2023-11-09 ENCOUNTER — TELEMEDICINE (OUTPATIENT)
Dept: BEHAVIORAL HEALTH | Facility: CLINIC | Age: 48
End: 2023-11-09
Payer: MEDICARE

## 2023-11-09 DIAGNOSIS — G24.3 CERVICAL DYSTONIA: ICD-10-CM

## 2023-11-09 DIAGNOSIS — G47.00 INSOMNIA, UNSPECIFIED TYPE: ICD-10-CM

## 2023-11-09 DIAGNOSIS — F79 INTELLECTUAL DISABILITY: ICD-10-CM

## 2023-11-09 DIAGNOSIS — Q90.9 DOWN SYNDROME (HHS-HCC): ICD-10-CM

## 2023-11-09 DIAGNOSIS — G24.01 TARDIVE DYSKINESIA: ICD-10-CM

## 2023-11-09 DIAGNOSIS — G24.01 TARDIVE DYSTONIA: ICD-10-CM

## 2023-11-09 DIAGNOSIS — F41.9 ANXIETY: Primary | ICD-10-CM

## 2023-11-09 PROBLEM — F42.9 OBSESSIVE-COMPULSIVE DISORDER: Status: RESOLVED | Noted: 2023-09-08 | Resolved: 2023-11-09

## 2023-11-09 PROBLEM — R21 RASH: Status: RESOLVED | Noted: 2023-02-15 | Resolved: 2023-11-09

## 2023-11-09 PROBLEM — G21.19 DRUG-INDUCED PARKINSONISM (MULTI): Status: RESOLVED | Noted: 2023-02-15 | Resolved: 2023-11-09

## 2023-11-09 PROBLEM — R06.02 EXERTIONAL SHORTNESS OF BREATH: Status: RESOLVED | Noted: 2023-02-15 | Resolved: 2023-11-09

## 2023-11-09 PROBLEM — U07.1 COVID-19 VIRUS INFECTION: Status: RESOLVED | Noted: 2023-02-15 | Resolved: 2023-11-09

## 2023-11-09 PROBLEM — R30.0 DYSURIA: Status: RESOLVED | Noted: 2023-02-15 | Resolved: 2023-11-09

## 2023-11-09 PROBLEM — F42.9 OBSESSIVE COMPULSIVE DISORDER: Status: RESOLVED | Noted: 2023-02-15 | Resolved: 2023-11-09

## 2023-11-09 PROCEDURE — 99215 OFFICE O/P EST HI 40 MIN: CPT | Performed by: PSYCHIATRY & NEUROLOGY

## 2023-11-09 RX ORDER — CLONAZEPAM 0.5 MG/1
TABLET ORAL
Qty: 90 TABLET | Refills: 2 | Status: SHIPPED | OUTPATIENT
Start: 2023-11-09 | End: 2024-02-08 | Stop reason: SDUPTHER

## 2023-11-09 ASSESSMENT — ENCOUNTER SYMPTOMS
CHOKING: 0
ABDOMINAL PAIN: 0
VOMITING: 0
SEIZURES: 0
ACTIVITY CHANGE: 0
TROUBLE SWALLOWING: 1
DIARRHEA: 0
SLEEP DISTURBANCE: 0
CONSTIPATION: 0

## 2023-11-09 NOTE — PROGRESS NOTES
Outpatient Psychiatry    A HIPAA-compliant interactive audio and video telecommunication system which permits real time communications between the patient (at the originating site) and provider (at the distant site) was utilized to provide this telehealth service.     The patient, family, caregivers and guardian (as appropriate) have provided consent on this date to conduct treatment via this telehealth service.  The patient's identity and physical location were verified at the time of this visit.      Present for appointment: Jalen and mom/guardian (Jael).    SUBJECTIVE    Jalen has continued to make some slow/steady progress since we last met.  He is doing better with swallowing thin liquids and has been able to replace on of his tube feeds with a protein shake.  He is still anxious about the possibility of aspirating, but seems more willing to continue trying to expand his diet.  He had Botox treatment #3 on 10/18/23.  He has a new independent provider that will be starting soon.  He went to a [employer] dance at the [facility] and had a good time.  He has a new custom wheelchair.  His overall mood/disposition and anxiety have remained quite stable since decreasing his fluoxetine down to 20mg/day at our last visit.  He continues to take clonazepam.     Review of Systems   Constitutional:  Negative for activity change.   HENT:  Positive for trouble swallowing.    Respiratory:  Negative for choking.    Gastrointestinal:  Negative for abdominal pain, constipation, diarrhea and vomiting.   Musculoskeletal:  Positive for gait problem.   Neurological:  Negative for seizures.   Psychiatric/Behavioral:  Negative for behavioral problems and sleep disturbance.       Controlled Substance Evaluation  OARRS/PDMP reviewed: Pancho Garcia MD on 11/9/2023  3:45 PM  Is the patient prescribed a combination of a benzodiazepine and opioid? No  I have personally reviewed the OARRS report for Jalen Blackwell.   I  have considered the risks of abuse, dependence, addiction and diversion.    I believe that it is clinically appropriate for Jalen Blackwell to be prescribed this medication.    Last Urine Drug Screen:  Recent Results (from the past 8760 hour(s))   OPIATE/OPIOID/BENZO PRESCRIPTION COMPLIANCE    Collection Time: 03/29/23  1:24 PM   Result Value Ref Range    DRUG SCREEN COMMENT URINE SEE BELOW     Creatine, Urine 85.4 mg/dL    Amphetamine Screen, Urine PRESUMPTIVE NEGATIVE NEGATIVE    Barbiturate Screen, Urine PRESUMPTIVE NEGATIVE NEGATIVE    Cannabinoid Screen, Urine PRESUMPTIVE NEGATIVE NEGATIVE    Cocaine Screen, Urine PRESUMPTIVE NEGATIVE NEGATIVE    PCP Screen, Urine PRESUMPTIVE NEGATIVE NEGATIVE    7-Aminoclonazepam 654 (A) Cutoff <25 ng/mL    Alpha-Hydroxyalprazolam <25 Cutoff <25 ng/mL    Alpha-Hydroxymidazolam <25 Cutoff <25 ng/mL    Alprazolam <25 Cutoff <25 ng/mL    Chlordiazepoxide <25 Cutoff <25 ng/mL    Clonazepam <25 Cutoff <25 ng/mL    Diazepam <25 Cutoff <25 ng/mL    Lorazepam <25 Cutoff <25 ng/mL    Midazolam <25 Cutoff <25 ng/mL    Nordiazepam <25 Cutoff <25 ng/mL    Oxazepam <25 Cutoff <25 ng/mL    Temazepam <25 Cutoff <25 ng/mL    Zolpidem <25 Cutoff <25 ng/mL    Zolpidem Metabolite (ZCA) <25 Cutoff <25 ng/mL    6-Acetylmorphine <25 Cutoff <25 ng/mL    Codeine <50 Cutoff <50 ng/mL    Hydrocodone <25 Cutoff <25 ng/mL    Hydromorphone <25 Cutoff <25 ng/mL    Morphine Urine <50 Cutoff <50 ng/mL    Norhydrocodone <25 Cutoff <25 ng/mL    Noroxycodone <25 Cutoff <25 ng/mL    Oxycodone <25 Cutoff <25 ng/mL    Oxymorphone <25 Cutoff <25 ng/mL    Tramadol <50 Cutoff <50 ng/mL    O-Desmethyltramadol <50 Cutoff <50 ng/mL    Fentanyl <2.5 Cutoff<2.5 ng/mL    Norfentanyl <2.5 Cutoff<2.5 ng/mL    METHADONE CONFIRMATION,URINE <25 Cutoff <25 ng/mL    EDDP <25 Cutoff <25 ng/mL     Results as expected? Yes    Controlled Substance Agreement: 03/24/2023  Reviewed Controlled Substance Agreement, including but not  limited to:  Benefits, risks, and alternatives to treatment with a controlled substance medication(s).    Prescribed Controlled Substances:  Benzodiazepines: clonazepam  What is the patient's goal of therapy? Reduced anxiety and spasticity  Is this being achieved with current treatment? Yes  Activities of Daily Living:   Is your overall impression that this patient is benefiting (symptom reduction outweighs side effects) from benzodiazepine therapy? Yes   1. Physical Functioning: Same  2. Family Relationship: Same  3. Social Relationship: Same  4. Mood: Same  5. Sleep Patterns: Same  6. Overall Function: Same     MEDICATION HISTORY  Aripiprazole  Brexpiprazole - caused TD  Paroxetine - up to 60mg per day  Ingrezza  Sinemet - to address Parkinsonian effects of VMAT2 inhibitor    CURRENT MEDICATIONS    Current Outpatient Medications:     cetirizine (ZyrTEC) 10 mg tablet, Take 1 tablet (10 mg) by mouth once daily., Disp: 90 tablet, Rfl: 1    cholecalciferol (Vitamin D-3) 25 MCG (1000 UT) capsule, Take 1 capsule (25 mcg) by mouth once daily., Disp: , Rfl:     clobetasol (Temovate) 0.05 % external solution, Apply 0.05 Applications topically 2 times a day., Disp: , Rfl:     clonazePAM (KlonoPIN) 0.5 mg tablet, Take 1/2 tablet at 7:30am, 1/2 tablet at 2:30pm , and 1/2 tablet at 8:30pm, Disp: , Rfl:     cyanocobalamin (Vitamin B-12) 250 mcg tablet, TAKE 1 TABLET BY MOUTH EVERY DAY AS DIRECTED, Disp: 90 tablet, Rfl: 3    docusate sodium (Colace) 50 mg/5 mL oral liquid, Take 5 mL (50 mg) by mouth once daily., Disp: , Rfl:     erythromycin (Romycin) 5 mg/gram (0.5 %) ophthalmic ointment, 1/2 INCH STRIP IN AFFECTED EYE 4 TIMES A DAY, FOR 7 DAYS., Disp: , Rfl:     famotidine (Pepcid) 20 mg tablet, TAKE 1 TABLET AT BEDTIME AS NEEDED, Disp: 90 tablet, Rfl: 1    FLUoxetine (PROzac) 20 mg/5 mL (4 mg/mL) solution, Take 5 mL (20 mg) by mouth once daily., Disp: , Rfl:     hydrocortisone 1 % ointment, Apply 1 Film topically 2 times a  day., Disp: , Rfl:     hydrocortisone 2.5 % cream, Apply topically 2 times a day. to affected area, Disp: , Rfl:     ketoconazole (NIZOral) 2 % shampoo, APPLY TO SCALP AS DIRECTED., Disp: , Rfl:     levothyroxine (Synthroid, Levoxyl) 125 mcg tablet, TAKE 1 TABLET (125 MCG) BY MOUTH ONCE DAILY IN THE MORNING. TAKE BEFORE MEALS., Disp: 90 tablet, Rfl: 1    melatonin 5 mg tablet, Take 1 tablet (5 mg) by mouth once daily at bedtime., Disp: , Rfl:     midodrine (Proamatine) 10 mg tablet, Take 1 tablet (10 mg) by mouth 3 times a day., Disp: , Rfl:     multivitamin (Daily-Ricardo, with folic acid,) tablet, Take 1 tablet by mouth once daily., Disp: , Rfl:     omeprazole (PriLOSEC) 40 mg DR capsule, Take 1 capsule (40 mg) by mouth once daily., Disp: , Rfl:     psyllium (Metamucil) powder, Take 2 teaspoons in 8 oz. Liquid daily in the morning- one month supply, Disp: , Rfl:     sodium chloride 1,000 mg tablet, Take 1 tablet (1 g) by mouth 2 times a day., Disp: , Rfl:     triamcinolone (Kenalog) 0.1 % cream, Apply 1 Film topically 2 times a day., Disp: , Rfl:     SOCIAL HISTORY  Living situation Lives with family   Provider agency None currently   Work or day program None currently   School [employer]   Guardianship Mother (Jael)   SSA ?   Bx Specialist ?   Nicotine None   Alcohol None   Other drugs None     OBJECTIVE    Lab Results   Component Value Date    HGB 15.0 10/04/2023     10/04/2023    NEUTROABS 7.33 02/04/2023    GLUCOSE 103 (H) 10/04/2023     10/04/2023    K 3.9 10/04/2023    CO2 29 10/04/2023    CALCIUM 9.1 10/04/2023    CREATININE 0.87 10/04/2023    AST 19 10/04/2023    ALT 17 10/04/2023    AMMONIA 29 07/06/2021    TSH 3.26 10/04/2023    CHOL 181 10/04/2023    LDLF 127 (H) 01/12/2018    TRIG 135 10/04/2023    [id number] 667 06/17/2022     Therapeutic Drug Monitoring  N/A    Electrocardiograms  07/07/2021: NSR, VR 80, QTc 426    MENTAL STATUS EXAM  General/Appearance: Appropriate  dress/hygiene/grooming.  Attitude/Behavior: Actively engages with interview.  Speech/Communication: Normal volume/rate/tone. Some stuttering.  Motor: Involuntary trunk and/or limb movements.  Gait: Not assessed at this visit.  Mood: Neutral.  Affect: Neutral.  Thought processes: [place].  Thought content: Future-oriented.  Perception: Does not appear to be experiencing or responding to hallucinations.  Sensorium/Cognition: Oriented to self and surroundings. Intellectual impairment.  Memory: Recent & remote recall is intact.  Insight: Minimal.  Judgment: Good. Adherent with prescribed/recommended treatment(s).    ASSESSMENT  Jalen has continued to do well since our last visit.  He has managed to try some new foods/liquids despite being very anxious about the possibility of having another choking event.  He still remains mostly or at least partially g-tube dependent for nutrition.  TD symptoms are mostly unchanged.  I think it would be reasonable to proceed with the final stage of tapering off of fluoxetine in the event that it is contributing to his TD symptoms, while monitoring his overall and mood and anxiety.    PROBLEM LIST  Intellectual developmental disorder, mild, secondary to Down syndrome  Anxiety  Tardive dyskinesia    PLAN  -- Decrease fluoxetine liquid to 10mg daily for 4 weeks then stop  -- Continue clonazepam 0.25mg - 0.5mg - 0.5mg for anxiety and dystonia  -- Follow up 3 months or sooner if needed    Pancho Garcia MD    Prep time on date of the patient encounter: 10 minutes   Time spent directly with patient/family/caregiver: 35 minutes   Additional time spent on patient care activities: 0 minutes   Documentation time: 10 minutes   Other time spent: 0 minutes   Total time on date of patient encounter: 55 minutes

## 2023-11-10 DIAGNOSIS — R05.1 ACUTE COUGH: ICD-10-CM

## 2023-11-10 DIAGNOSIS — R79.89 LOW VITAMIN D LEVEL: Primary | ICD-10-CM

## 2023-11-13 RX ORDER — CETIRIZINE HYDROCHLORIDE 10 MG/1
10 TABLET ORAL DAILY
Qty: 90 TABLET | Refills: 2 | Status: SHIPPED | OUTPATIENT
Start: 2023-11-13 | End: 2024-01-02 | Stop reason: WASHOUT

## 2023-11-13 RX ORDER — CHOLECALCIFEROL (VITAMIN D3) 25 MCG
1000 TABLET ORAL DAILY
Qty: 90 TABLET | Refills: 3 | Status: SHIPPED | OUTPATIENT
Start: 2023-11-13 | End: 2024-11-12

## 2023-11-13 NOTE — TELEPHONE ENCOUNTER
Requested Prescriptions     Pending Prescriptions Disp Refills    cetirizine (ZyrTEC) 10 mg tablet [Pharmacy Med Name: CETIRIZINE HCL 10 MG TABLET] 90 tablet 2     Sig: TAKE 1 TABLET BY MOUTH EVERY DAY    cholecalciferol (Vitamin D3) 25 MCG (1000 UT) tablet 90 tablet 3     Sig: Take 1 tablet (1,000 Units) by mouth once daily.

## 2023-11-20 ENCOUNTER — CLINICAL SUPPORT (OUTPATIENT)
Dept: WOUND CARE | Facility: HOSPITAL | Age: 48
End: 2023-11-20
Payer: MEDICARE

## 2023-11-20 PROCEDURE — 17250 CHEM CAUT OF GRANLTJ TISSUE: CPT | Mod: PO

## 2023-11-30 ENCOUNTER — APPOINTMENT (OUTPATIENT)
Dept: SPEECH THERAPY | Facility: HOSPITAL | Age: 48
End: 2023-11-30
Payer: MEDICARE

## 2023-12-04 ENCOUNTER — CLINICAL SUPPORT (OUTPATIENT)
Dept: WOUND CARE | Facility: HOSPITAL | Age: 48
End: 2023-12-04
Payer: MEDICARE

## 2023-12-04 PROCEDURE — 17250 CHEM CAUT OF GRANLTJ TISSUE: CPT | Mod: PO

## 2023-12-20 ENCOUNTER — CLINICAL SUPPORT (OUTPATIENT)
Dept: WOUND CARE | Facility: HOSPITAL | Age: 48
End: 2023-12-20
Payer: MEDICARE

## 2023-12-20 PROCEDURE — 17250 CHEM CAUT OF GRANLTJ TISSUE: CPT

## 2024-01-02 NOTE — PROGRESS NOTES
Mom emailed asking for refill on clonazepam.  Reviewed OARRS - appears Rx from 11/09/23 has not yet been filled.  Mom will contact pharmacy to locate and fill this script.

## 2024-01-03 ENCOUNTER — CLINICAL SUPPORT (OUTPATIENT)
Dept: WOUND CARE | Facility: HOSPITAL | Age: 49
End: 2024-01-03
Payer: MEDICARE

## 2024-01-03 PROCEDURE — 17250 CHEM CAUT OF GRANLTJ TISSUE: CPT

## 2024-01-04 ENCOUNTER — SPECIALTY PHARMACY (OUTPATIENT)
Dept: PHARMACY | Facility: CLINIC | Age: 49
End: 2024-01-04

## 2024-01-15 ENCOUNTER — CLINICAL SUPPORT (OUTPATIENT)
Dept: WOUND CARE | Facility: HOSPITAL | Age: 49
End: 2024-01-15
Payer: MEDICARE

## 2024-01-15 PROCEDURE — 17250 CHEM CAUT OF GRANLTJ TISSUE: CPT

## 2024-01-18 ENCOUNTER — EVALUATION (OUTPATIENT)
Dept: SPEECH THERAPY | Facility: HOSPITAL | Age: 49
End: 2024-01-18
Payer: MEDICARE

## 2024-01-18 DIAGNOSIS — R13.12 OROPHARYNGEAL DYSPHAGIA: Primary | ICD-10-CM

## 2024-01-18 PROCEDURE — 92610 EVALUATE SWALLOWING FUNCTION: CPT | Mod: GN | Performed by: PHARMACIST

## 2024-01-18 ASSESSMENT — PAIN - FUNCTIONAL ASSESSMENT: PAIN_FUNCTIONAL_ASSESSMENT: 0-10

## 2024-01-18 ASSESSMENT — ENCOUNTER SYMPTOMS
OCCASIONAL FEELINGS OF UNSTEADINESS: 1
DEPRESSION: 0
LOSS OF SENSATION IN FEET: 0

## 2024-01-18 ASSESSMENT — PAIN SCALES - GENERAL: PAINLEVEL_OUTOF10: 0 - NO PAIN

## 2024-01-18 NOTE — PROGRESS NOTES
Speech-Language Pathology    Outpatient Speech-Language Pathology Clinical Swallow Evaluation    Patient Name: Jalen Blackwell  MRN: [id number]  Today's Date: 1/19/2024  Time Calculation  Start Time: 1340  Stop Time: 1435  Time Calculation (min): 55 min      Current Problem:   1. Oropharyngeal dysphagia  Referral to Speech Therapy    Follow Up In Speech Therapy            Recommendations:  Additional Recommendations: Modified barium swallow study, Dysphagia treatment  Solid Diet Recommendations : Soft & bite sized/chopped (IDDSI Level 6)  Liquid Diet Recommendations: Thin (IDDSI Level 0)  Compensatory Swallowing Strategies: Upright 90 degrees as possible for all oral intake, Decrease distractions during eating/feeding, Full supervision with meals, Alternate solids and liquids, No straws, Single sips, Small bites/sips, Eat/feed slowly, Add moisture to solids  Medication Administration Recommendations: Crushed, With Pureed (or via PEG tube)    Dysphagia Goals:  LTG#1:Pt will consume 100% of nutrition, hydration, and medication by mouth without changes in pulmonary status.    STG#1:Pt will consume current diet (mechanical soft solids and thin liquids) without overt s/sx aspiration/penetration >95% of the time during PO trials with SLP as well as per parent report.  STG#2:Pt will consume trials of upgraded solid/liquid textures without overt s/sx aspiration/penetration in order for consideration of diet texture advancement.  STG#3:Pt will complete MBSS for objective assessment of current swallow function, safest/least restrictive diet, and any effective compensatory strategies.    Assessment:  Assessment Results: Jalen presents this date for clinical swallow evaluation due to 1-year hx of dysphagia. Pt with hx of Down's syndrome. His mother assisted with history. Pt had a relatively WFL swallow prior to a little over a year ago (with the exception that his parents avoided very dry/textured/tough solids such as large  pieces of raw vegetables). Pt was diagnosed with tardive dyskinesia 3-4 years ago. His mom reports that the medications that he was prescribed to reduce the symptoms of tardive dyskinesia resulted in parkinsonism, which led to dysphagia. Pt was hospitalized with aspiration PNA in 02/2023. MBSS completed during hospitalization revealed severe pharyngeal dysphagia with recommendation for primary nutrition delivery via PEG tube, with small meals of soft/chopped solids and honey-thick liquids. Pt completed dysphagia therapy after hospitalization and was gradually upgraded to thin liquids with continued mechanical soft solids. This SLP reviewed communication from pt's previous SLP, who noted that pt will likely benefit from a multidisciplinary approach to feeding/swallow due to significant anxiety related to swallowing.    Brief clinical swallow evaluation was completed this date (secondary to pt's anxiety). Ongoing diet texture analysis will be completed as pt gets used to new clinician in order to establish rapport and maximize successful participation. Oral motor exam revealed tongue thrust, dry-appearing oral mucosa, functional lingual/labial strength/ROM, and adequate A/P transit for liquids. Laryngeal elevation was visualized or palpated with all PO trials of liquids, however adequacy of hyolaryngeal elevation/excursion cannot be accurately determined with CSE. No overt s/sx aspiration/penetration were noted.    Prognosis: Fair  Medical Staff Made Aware: Yes  Strengths: Family/Caregiver Suppport  Barriers: Cognition, Comorbidities (Anxiety)      Plan:  Inpatient/Swing Bed or Outpatient: Outpatient  Treatment/Interventions: Assess diet tolerance, Bolus trials, Diet recommendations, Complete MBSS, Patient/family education, Pharyngeal exercises  SLP Plan: Skilled SLP  SLP Frequency: 1x per week (or every other week)  Duration:  (5 visits)  Discussed POC: Patient, Caregiver/family  Patient/Caregiver Agreeable:  Yes      Subjective     General Visit Information:  Patient Class: Outpatient  Living Environment: Home  Arrival: Accompanied by: __ (mom (Teena) and step-dad)  Ordering Physician: Dr. Childs  Reason for Referral: Oropharyngeal dysphagia  Past Medical History Relevant to Rehab: Down syndrome, allergic rhinitis, anxiety, cervical dystonia, GERD, hypotension, hyperlipidemia, hypothyroidism, LAUREL, lung nodule, tardive dyskinesia, aspiration PNA  Developmental Status: Delayed  Patient Seen During This Visit: Yes  Certification Period Start Date: 10/16/23  Certification Period End Date: 10/15/24  Number of Authorized Treatments : Medical necessity  Total Number of Visits :  (1)  Base[employer] Diet: Prior to onset of parkinsonian symptoms, regular/thin  Current Diet : Mechanical soft/thin, no straws, small/single sips      Objective       Baseline Assessment:  Respiratory Status: Room air  Behavior/Cognition: Alert, Pleasant mood, Requires cueing (Cooperative with min encouragement from SLP and family)  Patient Positioning:  (Upright in wheelchair)  Baseline Vocal Quality: Normal  Volitional Swallow: Within Functional Limits      Pain:  Pain Assessment: 0-10  Pain Score: 0 - No pain     Consistencies Trialed:  Consistencies Trialed: Yes  Consistencies Trialed: Thin (IDDSI Level 0) - Cup      Clinical Observations:  Management of Oral Secretions: Adequate  Was The 3 oz Swallow Protocol Completed: No (deferred due to pt fear of choking)  Multiple Swallows: Thin (IDDSI Level 0) - Cup

## 2024-01-29 ENCOUNTER — CLINICAL SUPPORT (OUTPATIENT)
Dept: WOUND CARE | Facility: HOSPITAL | Age: 49
End: 2024-01-29
Payer: MEDICARE

## 2024-01-29 PROCEDURE — 17250 CHEM CAUT OF GRANLTJ TISSUE: CPT

## 2024-01-30 ENCOUNTER — TREATMENT (OUTPATIENT)
Dept: SPEECH THERAPY | Facility: HOSPITAL | Age: 49
End: 2024-01-30
Payer: MEDICARE

## 2024-01-30 DIAGNOSIS — R13.12 OROPHARYNGEAL DYSPHAGIA: ICD-10-CM

## 2024-01-30 DIAGNOSIS — R13.10 DYSPHAGIA, UNSPECIFIED TYPE: Primary | ICD-10-CM

## 2024-01-30 PROCEDURE — 92526 ORAL FUNCTION THERAPY: CPT | Mod: GN | Performed by: PHARMACIST

## 2024-01-30 ASSESSMENT — PAIN SCALES - GENERAL: PAINLEVEL_OUTOF10: 0 - NO PAIN

## 2024-01-30 ASSESSMENT — PAIN - FUNCTIONAL ASSESSMENT: PAIN_FUNCTIONAL_ASSESSMENT: 0-10

## 2024-01-30 NOTE — PROGRESS NOTES
Speech-Language Pathology Clinical Swallow Treatment    Patient Name: Jalen Blackwell  MRN: [id number]  : 1975  Today's Date: 24  Start time:  1034  Stop time:  1120  Time calculation (min) :  46    ASSESSMENT  SLP TX Intervention Outcome: Making Progress Towards Goals  Treatment Tolerance: Patient tolerated treatment well    Impressions: Mild oral dysphagia with concern for possible pharyngeal dysphagia. Pt with hx of severe pharyngeal dysphagia per MBSS in 2023, but has improved clinically and would benefit from a repeat MBSS to further inform nature of dysphagia, safest/least restrictive diet, and POC.    PLAN  Recommended solid consistency: Soft/bite-sized (IDDSI level 6)  Recommended liquid consistency: Thin (IDDSI 0)  Recommended medication administration: Crushed, in puree, or via PEG tube  Safe swallow strategies: Upright positioning for all PO intake, Slow rate of intake, Chew thoroughly, Do not feed unless pt is fully alert and upright, small bites/sips  Inpatient/Swing Bed or Outpatient: Outpatient  SLP TX Plan: Continue Plan of Care  SLP Plan: Skilled SLP  SLP Frequency: 1x per week (or every other week)  Duration:  (4 visits)    SUBJECTIVE  Respiratory status: Room air  Positioning: Upright in wheelchair  Pt was alert, pleasant, and cooperative for session.    Pain Assessment: 0-10  Pain Score: 0 - No pain      OBJECTIVE  Therapeutic Swallow  Therapeutic Swallow Intervention : PO Trials, Caregiver Education  Solid Diet Recommendations: Soft & bite sized/chopped (IDDSI Level 6)  Liquid Diet Recommendations: Thin (IDDSI Level 0)  Swallow Comments: Ongoing diet texture analysis and clinical swallow assessment was completed. Pt consumed soft/bite-sized solids (veggie burger x4 bites, mac n cheese x4-6oz) via self-feeding. Pt drank 8oz of thin liquid via cup sips.  ORAL PHASE: Natural dentition in fair-good condition. Tongue thrust again noted. Mastication of soft solids was mild-moderately  prolonged and labored, with multiple dry swallows and/or liquid washes required to clear oral residuals (pt completed these independently).  PHARYNGEAL PHASE: Laryngeal elevation appeared adequate to visualization and palpation, however adequacy of hyolaryngeal elevation/excursion cannot be determined with clinical observation. No immediate or delayed s/sx aspiration were observed with any consistencies; immediate throat clear was observed x1 after bite of soft solid.      Treatment/Education:  Results and recommendations were relayed to: Patient  Education provided: Yes   Learner: Patient   Barriers to learning: Cognitive limitations barrier   Method of teaching: Verbal and Demonstration   Topic: Results of ongoing assessment, recommended diet modifications, recommendation for MBSS   Outcome of teaching: Caregiver demonstrated good understanding, Pt demonstrated partial understanding, and Pt verbalized understanding. Pt verbalized agreement to participate in MBSS. SLP to request order for MBSS from physician.    Goals:  LTG#1:Pt will consume 100% of nutrition, hydration, and medication by mouth without changes in pulmonary status. 1/29/24:ONGOING (pt receives some nutrition/hydration/medications via PEG tube)     STG#1:Pt will consume current diet (mechanical soft solids and thin liquids) without overt s/sx aspiration/penetration >95% of the time during PO trials with SLP as well as per parent report. 1/29/24:ONGOING (met this date, but additional data collection is warranted to determine patterns)  STG#2:Pt will consume trials of upgraded solid/liquid textures without overt s/sx aspiration/penetration in order for consideration of diet texture advancement. 1/29/24:NOT ADDRESSED THIS DATE  STG#3:Pt will complete MBSS for objective assessment of current swallow function, safest/least restrictive diet, and any effective compensatory strategies. 1/29/24:ONGOING (order for MBSS requested)

## 2024-01-31 DIAGNOSIS — R13.12 OROPHARYNGEAL DYSPHAGIA: Primary | ICD-10-CM

## 2024-02-02 DIAGNOSIS — K21.9 GASTROESOPHAGEAL REFLUX DISEASE WITHOUT ESOPHAGITIS: ICD-10-CM

## 2024-02-02 NOTE — TELEPHONE ENCOUNTER
Requested Prescriptions     Pending Prescriptions Disp Refills    famotidine (Pepcid) 20 mg tablet [Pharmacy Med Name: FAMOTIDINE 20 MG TABLET] 90 tablet 1     Sig: TAKE 1 TABLET BY MOUTH EVERY DAY AT BEDTIME AS NEEDED

## 2024-02-04 RX ORDER — FAMOTIDINE 20 MG/1
TABLET, FILM COATED ORAL
Qty: 90 TABLET | Refills: 1 | Status: SHIPPED | OUTPATIENT
Start: 2024-02-04

## 2024-02-05 ENCOUNTER — APPOINTMENT (OUTPATIENT)
Dept: WOUND CARE | Facility: HOSPITAL | Age: 49
End: 2024-02-05
Payer: COMMERCIAL

## 2024-02-06 ENCOUNTER — TREATMENT (OUTPATIENT)
Dept: SPEECH THERAPY | Facility: HOSPITAL | Age: 49
End: 2024-02-06
Payer: MEDICARE

## 2024-02-06 DIAGNOSIS — R13.12 OROPHARYNGEAL DYSPHAGIA: ICD-10-CM

## 2024-02-06 PROCEDURE — 92526 ORAL FUNCTION THERAPY: CPT | Mod: GN | Performed by: PHARMACIST

## 2024-02-06 ASSESSMENT — PAIN - FUNCTIONAL ASSESSMENT: PAIN_FUNCTIONAL_ASSESSMENT: 0-10

## 2024-02-06 ASSESSMENT — PAIN SCALES - GENERAL: PAINLEVEL_OUTOF10: 0 - NO PAIN

## 2024-02-07 DIAGNOSIS — Z00.00 ROUTINE GENERAL MEDICAL EXAMINATION AT HEALTH CARE FACILITY: ICD-10-CM

## 2024-02-07 NOTE — TELEPHONE ENCOUNTER
Requested Prescriptions     Pending Prescriptions Disp Refills    multivitamin (Daily-Ricarod, with folic acid,) tablet 90 tablet 3     Sig: Take 1 tablet by mouth once daily.

## 2024-02-07 NOTE — PROGRESS NOTES
Speech-Language Pathology Clinical Swallow Treatment     Patient Name: Jalen Blackwell  MRN: [id number]  : 1975  Today's Date: 24  Start time:  1035  Stop time:  1125  Time calculation (min) :  50     ASSESSMENT  SLP TX Intervention Outcome: Making Progress Towards Goals  Treatment Tolerance: Patient tolerated treatment well     Impressions: Mild oral dysphagia with concern for possible pharyngeal dysphagia. Pt with hx of severe pharyngeal dysphagia per MBSS in 2023, but has improved clinically and would benefit from a repeat MBSS to further inform nature of dysphagia, safest/least restrictive diet, and POC.     PLAN  Recommended solid consistency: Soft/bite-sized (IDDSI level 6)  Recommended liquid consistency: Thin (IDDSI 0)  Recommended medication administration: Crushed, in puree, or via PEG tube  Safe swallow strategies: Upright positioning for all PO intake, Slow rate of intake, Chew thoroughly, Do not feed unless pt is fully alert and upright, small bites/sips  Inpatient/Swing Bed or Outpatient: Outpatient  SLP TX Plan: Continue Plan of Care  SLP Plan: Skilled SLP  SLP Frequency: 1x per week (or every other week)  Duration:  (3 visits)     SUBJECTIVE  Respiratory status: Room air  Positioning: Upright in wheelchair  Pt was alert, pleasant, and cooperative for session.     Pain Assessment: 0-10  Pain Score: 0 - No pain        OBJECTIVE  Therapeutic Swallow  Therapeutic Swallow Intervention : PO Trials, Caregiver Education  Solid Diet Recommendations: Soft & bite sized/chopped (IDDSI Level 6)  Liquid Diet Recommendations: Thin (IDDSI Level 0)  Swallow Comments: Ongoing diet texture analysis and clinical swallow assessment was completed. Pt consumed soft/bite-sized solids (pasta cut into pieces, grilled cheese sandwich) via self-feeding. Pt drank 8oz of thin liquid via cup sips. With encouragement and positive reinforcement, pt also ate the crust of the grilled cheese sandwich (a regular texture  solid).  ORAL PHASE: Natural dentition in fair-good condition. Tongue thrust again noted. Mastication of soft and regular solids was mild-moderately prolonged and labored, with multiple dry swallows and/or liquid washes required to clear oral residuals at times (pt completed these independently).  PHARYNGEAL PHASE: Laryngeal elevation appeared adequate to visualization and palpation, however adequacy of hyolaryngeal elevation/excursion cannot be determined with clinical observation. No immediate or delayed s/sx aspiration were observed with any consistencies; immediate throat clear was observed x1 after sip of thin liquid.        Goals:  LTG#1:Pt will consume 100% of nutrition, hydration, and medication by mouth without changes in pulmonary status.   Status: Progressing   Progress this date: 2/6/24: Pt ate 90% of meal during session including regular texture solids. Pt continues to receive some tube feedings at home.     STG#1:Pt will consume current diet (mechanical soft solids and thin liquids) without overt s/sx aspiration/penetration >95% of the time during PO trials with SLP as well as per parent report.   Status: Goal met   Progress this date: 2/6/24: x1 throat clear after thin liquid sip, but no other overt s/sx aspiration/penetration with liquids or soft solids  STG#2:Pt will consume trials of upgraded solid/liquid textures without overt s/sx aspiration/penetration in order for consideration of diet texture advancement.   Status: Progressing   Progress this date: 2/6/24: Pt required encouragement, but took small bites of regular texture solids without s/sx asp/pen  STG#3:Pt will complete MBSS for objective assessment of current swallow function, safest/least restrictive diet, and any effective compensatory strategies.   Status: No progress made   Progress this date: 2/6/24: Pt's mom reported received order for MBSS but has not scheduled yet

## 2024-02-08 ENCOUNTER — TELEMEDICINE (OUTPATIENT)
Dept: BEHAVIORAL HEALTH | Facility: CLINIC | Age: 49
End: 2024-02-08
Payer: MEDICARE

## 2024-02-08 DIAGNOSIS — G24.01 TARDIVE DYSKINESIA: ICD-10-CM

## 2024-02-08 DIAGNOSIS — G24.01 TARDIVE DYSTONIA: ICD-10-CM

## 2024-02-08 DIAGNOSIS — Q90.9 DOWN SYNDROME (HHS-HCC): ICD-10-CM

## 2024-02-08 DIAGNOSIS — G24.3 CERVICAL DYSTONIA: ICD-10-CM

## 2024-02-08 DIAGNOSIS — F41.9 ANXIETY: Primary | ICD-10-CM

## 2024-02-08 DIAGNOSIS — F79 INTELLECTUAL DISABILITY: ICD-10-CM

## 2024-02-08 DIAGNOSIS — G47.00 INSOMNIA, UNSPECIFIED TYPE: ICD-10-CM

## 2024-02-08 PROCEDURE — 1036F TOBACCO NON-USER: CPT | Performed by: PSYCHIATRY & NEUROLOGY

## 2024-02-08 PROCEDURE — 3008F BODY MASS INDEX DOCD: CPT | Performed by: PSYCHIATRY & NEUROLOGY

## 2024-02-08 PROCEDURE — 99215 OFFICE O/P EST HI 40 MIN: CPT | Performed by: PSYCHIATRY & NEUROLOGY

## 2024-02-08 RX ORDER — CLONAZEPAM 0.5 MG/1
TABLET ORAL
Qty: 90 TABLET | Refills: 1 | Status: SHIPPED | OUTPATIENT
Start: 2024-04-04 | End: 2024-04-14 | Stop reason: SDUPTHER

## 2024-02-08 RX ORDER — MULTIVITAMIN WITH FOLIC ACID 400 MCG
1 TABLET ORAL DAILY
Qty: 90 TABLET | Refills: 3 | Status: SHIPPED | OUTPATIENT
Start: 2024-02-08

## 2024-02-08 ASSESSMENT — ENCOUNTER SYMPTOMS
VOMITING: 0
SLEEP DISTURBANCE: 0
DIARRHEA: 0
CHOKING: 0
ABDOMINAL PAIN: 0
TROUBLE SWALLOWING: 1
SEIZURES: 0
CONSTIPATION: 0
ACTIVITY CHANGE: 0

## 2024-02-08 NOTE — PROGRESS NOTES
"Outpatient Psychiatry    A HIPAA-compliant interactive audio and video telecommunication system which permits real time communications between the patient (at the originating site) and provider (at the distant site) was utilized to provide this telehealth service.     The patient, family, caregivers and guardian (as appropriate) have provided consent on this date to conduct treatment via this telehealth service.  The patient's identity and physical location were verified at the time of this visit.      Present for appointment: Jalen and mom/guardian (Jael).    SUBJECTIVE    Jalen has continued to make some slow/steady progress since we last met.  He has not had any new health problems.  He is making progress with eating/swallowing (had a grilled cheese sandwich WITH the crust earlier this week); they are planning to repeat a swallow study in late February or early March.  He had a recent Botox treatment last week.  He did not do well at the Botox treatment session and seemed to experience much more pain/discomfort and distress than usual.  He has not had any significant change or worsening of mood or anxiety since removing fluoxetine (notably, his TD symptoms also have not changed).  Although he and mom have had many previous discussions about removing the g-tube eventually, today he says he is \"scared\" about that and doesn't want to do it (and doesn't want her talking about without his permission).     Review of Systems   Constitutional:  Negative for activity change.   HENT:  Positive for trouble swallowing.    Respiratory:  Negative for choking.    Gastrointestinal:  Negative for abdominal pain, constipation, diarrhea and vomiting.   Musculoskeletal:  Positive for gait problem.   Neurological:  Negative for seizures.   Psychiatric/Behavioral:  Negative for behavioral problems and sleep disturbance.       Controlled Substance Evaluation  OARRS/PDMP reviewed: Pancho Garcia MD on 2/8/2024  6:16 AM  Is " the patient prescribed a combination of a benzodiazepine and opioid? No  I have personally reviewed the OARRS report for Jalen Blackwell.   I have considered the risks of abuse, dependence, addiction and diversion.    I believe that it is clinically appropriate for Jalen Blackwell to be prescribed this medication.    Last Urine Drug Screen:  Recent Results (from the past 8760 hour(s))   OPIATE/OPIOID/BENZO PRESCRIPTION COMPLIANCE    Collection Time: 03/29/23  1:24 PM   Result Value Ref Range    DRUG SCREEN COMMENT URINE SEE BELOW     Creatine, Urine 85.4 mg/dL    Amphetamine Screen, Urine PRESUMPTIVE NEGATIVE NEGATIVE    Barbiturate Screen, Urine PRESUMPTIVE NEGATIVE NEGATIVE    Cannabinoid Screen, Urine PRESUMPTIVE NEGATIVE NEGATIVE    Cocaine Screen, Urine PRESUMPTIVE NEGATIVE NEGATIVE    PCP Screen, Urine PRESUMPTIVE NEGATIVE NEGATIVE    7-Aminoclonazepam 654 (A) Cutoff <25 ng/mL    Alpha-Hydroxyalprazolam <25 Cutoff <25 ng/mL    Alpha-Hydroxymidazolam <25 Cutoff <25 ng/mL    Alprazolam <25 Cutoff <25 ng/mL    Chlordiazepoxide <25 Cutoff <25 ng/mL    Clonazepam <25 Cutoff <25 ng/mL    Diazepam <25 Cutoff <25 ng/mL    Lorazepam <25 Cutoff <25 ng/mL    Midazolam <25 Cutoff <25 ng/mL    Nordiazepam <25 Cutoff <25 ng/mL    Oxazepam <25 Cutoff <25 ng/mL    Temazepam <25 Cutoff <25 ng/mL    Zolpidem <25 Cutoff <25 ng/mL    Zolpidem Metabolite (ZCA) <25 Cutoff <25 ng/mL    6-Acetylmorphine <25 Cutoff <25 ng/mL    Codeine <50 Cutoff <50 ng/mL    Hydrocodone <25 Cutoff <25 ng/mL    Hydromorphone <25 Cutoff <25 ng/mL    Morphine Urine <50 Cutoff <50 ng/mL    Norhydrocodone <25 Cutoff <25 ng/mL    Noroxycodone <25 Cutoff <25 ng/mL    Oxycodone <25 Cutoff <25 ng/mL    Oxymorphone <25 Cutoff <25 ng/mL    Tramadol <50 Cutoff <50 ng/mL    O-Desmethyltramadol <50 Cutoff <50 ng/mL    Fentanyl <2.5 Cutoff<2.5 ng/mL    Norfentanyl <2.5 Cutoff<2.5 ng/mL    METHADONE CONFIRMATION,URINE <25 Cutoff <25 ng/mL    EDDP <25 Cutoff <25  ng/mL     Results as expected? Yes    Controlled Substance Agreement: 03/24/2023  Reviewed Controlled Substance Agreement, including but not limited to:  Benefits, risks, and alternatives to treatment with a controlled substance medication(s).    Prescribed Controlled Substances:  Benzodiazepines: Clonazepam  What is the patient's goal of therapy? Reduced anxiety and spasticity  Is this being achieved with current treatment? Yes  Activities of Daily Living:   Is your overall impression that this patient is benefiting (symptom reduction outweighs side effects) from benzodiazepine therapy? Yes   1. Physical Functioning: Same  2. Family Relationship: Same  3. Social Relationship: Same  4. Mood: Same  5. Sleep Patterns: Same  6. Overall Function: Same     MEDICATION HISTORY  Aripiprazole  Brexpiprazole - caused TD  Fluoxetine - up to 60mg/day  Paroxetine - up to 60mg/day  Ingrezza - caused Parkinsonism  Sinemet - to address Parkinsonian effects of VMAT2 inhibitor    CURRENT MEDICATIONS    Current Outpatient Medications:     cholecalciferol (Vitamin D3) 25 MCG (1000 UT) tablet, Take 1 tablet (1,000 Units) by mouth once daily., Disp: 90 tablet, Rfl: 3    clobetasol (Temovate) 0.05 % external solution, Apply 0.05 Applications topically 2 times a day., Disp: , Rfl:     clonazePAM (KlonoPIN) 0.5 mg tablet, Take 1/2 tablet in the morning, 1 tablet in the afternoon, and 1 tablet at bedtime - may use 1/2 tablet once daily as needed for anxiety, Disp: 90 tablet, Rfl: 2    cyanocobalamin (Vitamin B-12) 250 mcg tablet, TAKE 1 TABLET BY MOUTH EVERY DAY AS DIRECTED, Disp: 90 tablet, Rfl: 3    docusate sodium (Colace) 50 mg/5 mL oral liquid, Take 5 mL (50 mg) by mouth once daily., Disp: , Rfl:     famotidine (Pepcid) 20 mg tablet, TAKE 1 TABLET BY MOUTH EVERY DAY AT BEDTIME AS NEEDED, Disp: 90 tablet, Rfl: 1    hydrocortisone 2.5 % cream, Apply topically 2 times a day. to affected area, Disp: , Rfl:     ketoconazole (NIZOral) 2 %  shampoo, APPLY TO SCALP AS DIRECTED., Disp: , Rfl:     levothyroxine (Synthroid, Levoxyl) 125 mcg tablet, TAKE 1 TABLET (125 MCG) BY MOUTH ONCE DAILY IN THE MORNING. TAKE BEFORE MEALS., Disp: 90 tablet, Rfl: 1    melatonin 5 mg tablet, Take 1 tablet (5 mg) by mouth once daily at bedtime., Disp: , Rfl:     midodrine (Proamatine) 10 mg tablet, Take 1 tablet (10 mg) by mouth 3 times a day., Disp: , Rfl:     multivitamin (Daily-Ricardo, with folic acid,) tablet, Take 1 tablet by mouth once daily., Disp: , Rfl:     omeprazole (PriLOSEC) 40 mg DR capsule, Take 1 capsule (40 mg) by mouth once daily., Disp: , Rfl:     psyllium (Metamucil) powder, Take 2 teaspoons in 8 oz. Liquid daily in the morning- one month supply, Disp: , Rfl:     sodium chloride 1,000 mg tablet, Take 1 tablet (1 g) by mouth 2 times a day., Disp: , Rfl:     triamcinolone (Kenalog) 0.1 % cream, Apply 1 Film topically 2 times a day., Disp: , Rfl:     SOCIAL HISTORY  Living situation Lives with family   Provider agency None currently   Work or day program None currently   School [employer]   Guardianship Mother (Jael)   SSA ?   Bx Specialist ?   Nicotine None   Alcohol None   Other drugs None     OBJECTIVE    Lab Results   Component Value Date    HGB 15.0 10/04/2023     10/04/2023    NEUTROABS 7.33 02/04/2023    GLUCOSE 103 (H) 10/04/2023     10/04/2023    K 3.9 10/04/2023    CO2 29 10/04/2023    CALCIUM 9.1 10/04/2023    CREATININE 0.87 10/04/2023    AST 19 10/04/2023    ALT 17 10/04/2023    AMMONIA 29 07/06/2021    TSH 3.26 10/04/2023    CHOL 181 10/04/2023    LDLF 127 (H) 01/12/2018    TRIG 135 10/04/2023    UVPYDDTV30 667 06/17/2022     Therapeutic Drug Monitoring  N/A    Electrocardiograms  07/07/2021: NSR, VR 80, QTc 426    MENTAL STATUS EXAM  General/Appearance: Appropriate dress/hygiene/grooming.  Attitude/Behavior: Actively engages with interview.  Speech/Communication: Normal volume/rate/tone. Some stuttering.  Motor: Involuntary  "trunk and/or limb movements.  Gait: Not assessed at this visit.  Mood: Neutral.  Affect: Neutral.  Thought processes: [place].  Thought content: Future-oriented.  Perception: Does not appear to be experiencing or responding to hallucinations.  Sensorium/Cognition: Oriented to self and surroundings. Intellectual impairment.  Memory: Recent & remote recall is intact.  Insight: Minimal.  Judgment: Good. Adherent with prescribed/recommended treatment(s).    ASSESSMENT  Jalen has mostly been doing well since our last visit and has made some tangible progress in improving his dysphagia.  He seems a little more edgy or anxious today than usual, especially surrounding the subject of reversal of the PEG, and it's not clear what this might be related to.  We agree not to \"push\" the subject today and can revisit it at future visits if he's feeling up to talking about it.  We will plan for an in-office visit later in the year to have an updated CSA and UDS completed.    PROBLEM LIST  Intellectual developmental disorder, mild, secondary to Down syndrome  Anxiety  Tardive dyskinesia    PLAN  -- Continue clonazepam 0.25mg - 0.5mg - 0.5mg for anxiety and dystonia   -- Follow up 3 months or sooner if needed    Pancho Garcia MD    Prep time on date of the patient encounter: 5 minutes   Time spent directly with patient/family/caregiver: 35 minutes   Additional time spent on patient care activities: 0 minutes   Documentation time: 5 minutes   Other time spent: 0 minutes   Total time on date of patient encounter: 45 minutes     "

## 2024-02-09 DIAGNOSIS — K21.9 GASTROESOPHAGEAL REFLUX DISEASE WITHOUT ESOPHAGITIS: Primary | ICD-10-CM

## 2024-02-09 RX ORDER — OMEPRAZOLE 40 MG/1
40 CAPSULE, DELAYED RELEASE ORAL DAILY
Qty: 90 CAPSULE | Refills: 2 | Status: SHIPPED | OUTPATIENT
Start: 2024-02-09

## 2024-02-09 NOTE — TELEPHONE ENCOUNTER
Requested Prescriptions     Pending Prescriptions Disp Refills    omeprazole (PriLOSEC) 40 mg DR capsule 90 capsule 2     Sig: Take 1 capsule (40 mg) by mouth once daily.

## 2024-02-12 ENCOUNTER — CLINICAL SUPPORT (OUTPATIENT)
Dept: WOUND CARE | Facility: HOSPITAL | Age: 49
End: 2024-02-12
Payer: MEDICARE

## 2024-02-12 PROCEDURE — 17250 CHEM CAUT OF GRANLTJ TISSUE: CPT

## 2024-02-13 ENCOUNTER — APPOINTMENT (OUTPATIENT)
Dept: SPEECH THERAPY | Facility: HOSPITAL | Age: 49
End: 2024-02-13
Payer: MEDICARE

## 2024-02-20 ENCOUNTER — TREATMENT (OUTPATIENT)
Dept: SPEECH THERAPY | Facility: HOSPITAL | Age: 49
End: 2024-02-20
Payer: MEDICARE

## 2024-02-20 DIAGNOSIS — R13.12 OROPHARYNGEAL DYSPHAGIA: ICD-10-CM

## 2024-02-20 PROCEDURE — 92526 ORAL FUNCTION THERAPY: CPT | Mod: GN | Performed by: PHARMACIST

## 2024-02-20 ASSESSMENT — PAIN SCALES - GENERAL: PAINLEVEL_OUTOF10: 0 - NO PAIN

## 2024-02-20 ASSESSMENT — PAIN - FUNCTIONAL ASSESSMENT: PAIN_FUNCTIONAL_ASSESSMENT: 0-10

## 2024-02-20 NOTE — PROGRESS NOTES
Speech-Language Pathology Clinical Swallow Treatment     Patient Name: Jalen Blackwell  MRN: [id number]  : 1975  Today's Date: 24  Start time:  1115  Stop time:  1205  Time calculation (min) :  50     ASSESSMENT  SLP TX Intervention Outcome: Making Progress Towards Goals  Treatment Tolerance: Patient tolerated treatment well     Impressions: Mild oral dysphagia with concern for possible pharyngeal dysphagia. Pt with hx of severe pharyngeal dysphagia per MBSS in 2023, but has improved clinically and would benefit from a repeat MBSS to further inform nature of dysphagia, safest/least restrictive diet, and POC.     PLAN  Recommended solid consistency: Soft/bite-sized (IDDSI level 6)  Recommended liquid consistency: Thin (IDDSI 0)  Recommended medication administration: Crushed, in puree, or via PEG tube  Safe swallow strategies: Upright positioning for all PO intake, Slow rate of intake, Chew thoroughly, Do not feed unless pt is fully alert and upright, small bites/sips  Inpatient/Swing Bed or Outpatient: Outpatient  SLP TX Plan: Continue Plan of Care  SLP Plan: Skilled SLP  SLP Frequency: 1x per week (or every other week)  Duration:  (2 visits)     SUBJECTIVE  Respiratory status: Room air  Positioning: Upright in wheelchair  Pt was alert, pleasant, and cooperative for session.     Pain Assessment: 0-10  Pain Score: 0 - No pain        OBJECTIVE  Therapeutic Swallow  Therapeutic Swallow Intervention : PO Trials, Caregiver Education  Solid Diet Recommendations: Soft & bite sized/chopped (IDDSI Level 6)  Liquid Diet Recommendations: Thin (IDDSI Level 0)  Swallow Comments: Ongoing diet texture analysis and clinical swallow assessment was completed. Pt consumed soft/bite-sized solids with increased textural complexity compared to baseline (protein waffle, leanne crackers with pudding) via self-feeding. Pt again benefited from encouragement and positive reinforcement to improve intake of textured solids. Pt  drank 8oz of thin liquid via cup sips.  ORAL PHASE: Natural dentition in fair-good condition. Tongue thrust again noted. Mastication of soft and regular solids was mild-moderately prolonged and labored, with multiple dry swallows and/or liquid washes required to clear oral residuals at times (pt completed these independently with increased time).  PHARYNGEAL PHASE: Laryngeal elevation appeared adequate to visualization and palpation, however adequacy of hyolaryngeal elevation/excursion cannot be determined with clinical observation. No immediate or delayed s/sx aspiration were observed with any consistencies; immediate dry throat clear was observed intermittently after liquids and solids this date.    Oral motor exercise:  Given direct models and verbal encouragement, pt completed simple oral motor exercises (lingual protrusion/lateralization/elevation, lingual protrusion/lateralization against resistance, labial seal, and labial pucker/retraction, x1-2 trials each. Accuracy was fair.        Goals:  LTG#1:Pt will consume 100% of nutrition, hydration, and medication by mouth without changes in pulmonary status.              Status: Progressing              Progress this date: 2/20/24: Pt ate 100% of meal during session. Pt continues to receive some tube feedings at home.   STG#1:Pt will consume current diet (mechanical soft solids and thin liquids) without overt s/sx aspiration/penetration >95% of the time during PO trials with SLP as well as per parent report.              Status: Goal met  STG#2:Pt will consume trials of upgraded solid/liquid textures without overt s/sx aspiration/penetration in order for consideration of diet texture advancement.              Status: Progressing              Progress this date: 2/20/24: Pt required encouragement, but took small bites of soft/regular texture solids without s/sx asp/pen  STG#3:Pt will complete MBSS for objective assessment of current swallow function, safest/least  restrictive diet, and any effective compensatory strategies.              Status: No progress made              Progress this date: 2/20/24: Pt's mom reported received order for MBSS but has not scheduled yet due to concern that pt will refuse select food items during study; plan to complete x1 add'l tx session to encourage intake of regular texture solids that will be provided during MBS  STG#4: Pt will complete oral motor exercises with 75% acc given mod cues in order to improve safety and efficiency of mastication and oral clearance.   Status: Goal initiated this date   Progress this date: 2/20/24: pt completed with fair acc given mod cues; unclear if enough effort was elicited to make sufficient gains

## 2024-02-26 ENCOUNTER — CLINICAL SUPPORT (OUTPATIENT)
Dept: WOUND CARE | Facility: HOSPITAL | Age: 49
End: 2024-02-26
Payer: MEDICARE

## 2024-02-26 PROCEDURE — 17250 CHEM CAUT OF GRANLTJ TISSUE: CPT

## 2024-03-04 ENCOUNTER — APPOINTMENT (OUTPATIENT)
Dept: WOUND CARE | Facility: HOSPITAL | Age: 49
End: 2024-03-04
Payer: MEDICARE

## 2024-03-06 ENCOUNTER — TREATMENT (OUTPATIENT)
Dept: SPEECH THERAPY | Facility: HOSPITAL | Age: 49
End: 2024-03-06
Payer: MEDICARE

## 2024-03-06 DIAGNOSIS — R13.12 OROPHARYNGEAL DYSPHAGIA: ICD-10-CM

## 2024-03-06 PROCEDURE — 92526 ORAL FUNCTION THERAPY: CPT | Mod: GN | Performed by: SPEECH-LANGUAGE PATHOLOGIST

## 2024-03-06 NOTE — PROGRESS NOTES
Speech-Language Pathology Clinical Swallow Treatment     Patient Name: Jalen Blackwell  MRN: [id number]  : 1975  Today's Date: 24  Start time:  1115  Stop time:  1205  Time calculation (min) :  50     ASSESSMENT  SLP TX Intervention Outcome: Making Progress Towards Goals  Treatment Tolerance: Patient tolerated treatment well     Impressions: Mild oral dysphagia with concern for possible pharyngeal dysphagia. Pt with hx of severe pharyngeal dysphagia per MBSS in 2023, but has improved clinically and would benefit from a repeat MBSS to further inform nature of dysphagia, safest/least restrictive diet, and POC.     PLAN  Recommended solid consistency: Soft/bite-sized (IDDSI level 6)  Recommended liquid consistency: Thin (IDDSI 0)  Recommended medication administration: Crushed, in puree, or via PEG tube  Safe swallow strategies: Upright positioning for all PO intake, Slow rate of intake, Chew thoroughly, Do not feed unless pt is fully alert and upright, small bites/sips  Inpatient/Swing Bed or Outpatient: Outpatient  SLP TX Plan: Continue Plan of Care  SLP Plan: Skilled SLP  SLP Frequency: 1x per week (or every other week)     SUBJECTIVE  Respiratory status: Room air  Positioning: Upright in wheelchair  Pt was alert, pleasant, and cooperative for session.     Pain Assessment: 0-10  Pain Score: 0 - No pain        OBJECTIVE  Therapeutic Swallow  Therapeutic Swallow Intervention : PO Trials, Caregiver Education  Solid Diet Recommendations: Soft & bite sized/chopped (IDDSI Level 6)  Liquid Diet Recommendations: Thin (IDDSI Level 0)  Swallow Comments: Ongoing diet texture analysis and clinical swallow assessment was completed. Pt consumed soft/bite-sized solids with increased textural complexity compared to baseline (protein waffle, leanne crackers with pudding) via self-feeding. Pt again benefited from encouragement and positive reinforcement to improve intake of textured solids. Pt drank 8oz of thin liquid  via cup sips.  ORAL PHASE: Natural dentition in fair-good condition. Tongue thrust again noted. Mastication of soft and regular solids was mild-moderately prolonged and labored, with multiple dry swallows and/or liquid washes required to clear oral residuals at times (pt completed these independently with increased time).  PHARYNGEAL PHASE: Laryngeal elevation appeared adequate to visualization and palpation, however adequacy of hyolaryngeal elevation/excursion cannot be determined with clinical observation. No immediate or delayed s/sx aspiration were observed with any consistencies; immediate dry throat clear was observed intermittently after liquids and solids this date.    Oral motor exercise:  Given direct models and verbal encouragement, pt completed simple oral motor exercises (lingual protrusion/lateralization/elevation, lingual protrusion/lateralization against resistance, labial seal, and labial pucker/retraction, x1-2 trials each. Accuracy was fair.        Goals:  LTG#1:Pt will consume 100% of nutrition, hydration, and medication by mouth without changes in pulmonary status.              Status: Progressing              Progress this date: 3/6/24: Pt ate 100% of meal during session. Pt continues to receive some tube feedings at home.   STG#1:Pt will consume current diet (mechanical soft solids and thin liquids) without overt s/sx aspiration/penetration >95% of the time during PO trials with SLP as well as per parent report.              Status: Goal met  STG#2:Pt will consume trials of upgraded solid/liquid textures without overt s/sx aspiration/penetration in order for consideration of diet texture advancement.              Status: Progressing              Progress this date: 3/6/24: Pt required encouragement, but took small bites of soft/regular texture solids without s/sx asp/pen  STG#3:Pt will complete MBSS for objective assessment of current swallow function, safest/least restrictive diet, and any  effective compensatory strategies.              Status: No progress made              Progress this date: 3/6/24: Pt's mom reported she would like to schedule MBS Study after progress this past week and this session.  STG#4: Pt will complete oral motor exercises with 75% acc given mod cues in order to improve safety and efficiency of mastication and oral clearance.   Status: Goal initiated this date   Progress this date: 3/6/24: pt completed with fair acc given min cues; unclear if enough effort was elicited to make sufficient gains

## 2024-03-11 ENCOUNTER — CLINICAL SUPPORT (OUTPATIENT)
Dept: WOUND CARE | Facility: HOSPITAL | Age: 49
End: 2024-03-11
Payer: MEDICARE

## 2024-03-11 PROCEDURE — 17250 CHEM CAUT OF GRANLTJ TISSUE: CPT

## 2024-03-13 ENCOUNTER — TREATMENT (OUTPATIENT)
Dept: SPEECH THERAPY | Facility: HOSPITAL | Age: 49
End: 2024-03-13
Payer: MEDICARE

## 2024-03-13 DIAGNOSIS — R13.12 OROPHARYNGEAL DYSPHAGIA: ICD-10-CM

## 2024-03-13 PROCEDURE — 92526 ORAL FUNCTION THERAPY: CPT | Mod: GN | Performed by: SPEECH-LANGUAGE PATHOLOGIST

## 2024-03-13 ASSESSMENT — PAIN SCALES - GENERAL: PAINLEVEL_OUTOF10: 0 - NO PAIN

## 2024-03-13 ASSESSMENT — PAIN - FUNCTIONAL ASSESSMENT: PAIN_FUNCTIONAL_ASSESSMENT: 0-10

## 2024-03-13 NOTE — PROGRESS NOTES
Speech-Language Pathology Clinical Swallow Treatment     Patient Name: Jalen Blackwell  MRN: [id number]  : 1975  Today's Date: 24  Start time:  1030  Stop time:  1123  Time calculation (min) :  53     ASSESSMENT  SLP TX Intervention Outcome: Making Progress Towards Goals  Treatment Tolerance: Patient tolerated treatment well     Impressions: Mild oral dysphagia with concern for possible pharyngeal dysphagia. Pt with hx of severe pharyngeal dysphagia per MBSS in 2023, but has improved clinically and would benefit from a repeat MBSS to further inform nature of dysphagia, safest/least restrictive diet, and POC.     PLAN  Recommended solid consistency: Soft/bite-sized (IDDSI level 6)  Recommended liquid consistency: Thin (IDDSI 0)  Recommended medication administration: Crushed, in puree, or via PEG tube  Safe swallow strategies: Upright positioning for all PO intake, Slow rate of intake, Chew thoroughly, Do not feed unless pt is fully alert and upright, small bites/sips  Inpatient/Swing Bed or Outpatient: Outpatient  SLP TX Plan: Continue Plan of Care  SLP Plan: Skilled SLP  SLP Frequency: 1x per week (or every other week)     SUBJECTIVE  Respiratory status: Room air  Positioning: Upright in wheelchair  Pt was alert, pleasant, and cooperative for session.     Pain Assessment: 0-10  Pain Score: 0 - No pain        OBJECTIVE  Therapeutic Swallow  Therapeutic Swallow Intervention : PO Trials, Caregiver Education  Solid Diet Recommendations: Soft & bite sized/chopped (IDDSI Level 6)  Liquid Diet Recommendations: Thin (IDDSI Level 0)  Swallow Comments: Ongoing diet texture analysis and clinical swallow assessment was completed. Pt consumed soft/bite-sized solids with increased textural complexity compared to baseline (protein waffle, leanne crackers with pudding) via self-feeding. Pt again benefited from encouragement and positive reinforcement to improve intake of textured solids. Pt drank 8oz of thin liquid  via cup sips.  ORAL PHASE: Natural dentition in fair-good condition. Tongue thrust again noted. Mastication of soft and regular solids was mild-moderately prolonged and labored, with multiple dry swallows and/or liquid washes required to clear oral residuals at times (pt completed these independently with increased time).  PHARYNGEAL PHASE: Laryngeal elevation appeared adequate to visualization and palpation, however adequacy of hyolaryngeal elevation/excursion cannot be determined with clinical observation. No immediate or delayed s/sx aspiration were observed with any consistencies; immediate dry throat clear was observed intermittently after liquids and solids this date.    Oral motor exercise:  Given direct models and verbal encouragement, pt completed simple oral motor exercises (lingual protrusion/lateralization/elevation, lingual protrusion/lateralization against resistance, labial seal, and labial pucker/retraction, x1-2 trials each. Accuracy was fair.        Goals:  LTG#1:Pt will consume 100% of nutrition, hydration, and medication by mouth without changes in pulmonary status.              Status: Progressing              Progress this date: 3/13/24: Pt ate 100% of meal during session. Pt continues to receive some tube feedings at home.   STG#1:Pt will consume current diet (mechanical soft solids and thin liquids) without overt s/sx aspiration/penetration >95% of the time during PO trials with SLP as well as per parent report.              Status: Goal met  STG#2:Pt will consume trials of upgraded solid/liquid textures without overt s/sx aspiration/penetration in order for consideration of diet texture advancement.              Status: Progressing              Progress this date: 3/13/24: Pt required encouragement, but took small bites of soft/regular texture solids without s/sx asp/pen  STG#3:Pt will complete MBSS for objective assessment of current swallow function, safest/least restrictive diet, and any  effective compensatory strategies.              Status: No progress made              Progress this date: 3/13/24: Pt's mom reported she would like to schedule MBS Study after progress this past week and this session.  STG#4: Pt will complete oral motor exercises with 75% acc given mod cues in order to improve safety and efficiency of mastication and oral clearance.   Status: Goal initiated this date   Progress this date: 3/13/24: pt completed with fair acc given min cues; unclear if enough effort was elicited to make sufficient gains

## 2024-03-14 DIAGNOSIS — R13.12 OROPHARYNGEAL DYSPHAGIA: Primary | ICD-10-CM

## 2024-03-15 DIAGNOSIS — E03.9 HYPOTHYROIDISM, UNSPECIFIED TYPE: ICD-10-CM

## 2024-03-15 RX ORDER — LEVOTHYROXINE SODIUM 125 UG/1
125 TABLET ORAL
Qty: 90 TABLET | Refills: 1 | Status: SHIPPED | OUTPATIENT
Start: 2024-03-15 | End: 2025-03-15

## 2024-03-20 ENCOUNTER — APPOINTMENT (OUTPATIENT)
Dept: SPEECH THERAPY | Facility: HOSPITAL | Age: 49
End: 2024-03-20
Payer: MEDICARE

## 2024-03-25 ENCOUNTER — CLINICAL SUPPORT (OUTPATIENT)
Dept: WOUND CARE | Facility: HOSPITAL | Age: 49
End: 2024-03-25
Payer: MEDICARE

## 2024-03-25 PROCEDURE — 17250 CHEM CAUT OF GRANLTJ TISSUE: CPT

## 2024-03-27 ENCOUNTER — TREATMENT (OUTPATIENT)
Dept: SPEECH THERAPY | Facility: HOSPITAL | Age: 49
End: 2024-03-27
Payer: MEDICARE

## 2024-03-27 DIAGNOSIS — R13.12 OROPHARYNGEAL DYSPHAGIA: Primary | ICD-10-CM

## 2024-03-27 PROCEDURE — 92526 ORAL FUNCTION THERAPY: CPT | Mod: GN | Performed by: SPEECH-LANGUAGE PATHOLOGIST

## 2024-03-27 ASSESSMENT — PAIN SCALES - GENERAL: PAINLEVEL_OUTOF10: 0 - NO PAIN

## 2024-03-27 ASSESSMENT — PAIN - FUNCTIONAL ASSESSMENT: PAIN_FUNCTIONAL_ASSESSMENT: 0-10

## 2024-03-27 NOTE — PROGRESS NOTES
Speech-Language Pathology Clinical Swallow Treatment     Patient Name: Jalen Blackwell  MRN: [id number]  : 1975  Today's Date: 24  Start time:  1030  Stop time:  1123  Time calculation (min) :  53     ASSESSMENT  SLP TX Intervention Outcome: Making Progress Towards Goals  Treatment Tolerance: Patient tolerated treatment well     Impressions: Mild oral dysphagia with concern for possible pharyngeal dysphagia. Pt with hx of severe pharyngeal dysphagia per MBSS in 2023, but has improved clinically and would benefit from a repeat MBSS to further inform nature of dysphagia, safest/least restrictive diet, and POC.     PLAN  Recommended solid consistency: Soft/bite-sized (IDDSI level 6)  Recommended liquid consistency: Thin (IDDSI 0)  Recommended medication administration: Crushed, in puree, or via PEG tube  Safe swallow strategies: Upright positioning for all PO intake, Slow rate of intake, Chew thoroughly, Do not feed unless pt is fully alert and upright, small bites/sips  Inpatient/Swing Bed or Outpatient: Outpatient  SLP TX Plan: Continue Plan of Care  SLP Plan: Skilled SLP  SLP Frequency: 1x per week (or every other week)     SUBJECTIVE  Respiratory status: Room air  Positioning: Upright in wheelchair  Pt was alert, pleasant, and cooperative for session.     Pain Assessment: 0-10  Pain Score: 0 - No pain        OBJECTIVE  Therapeutic Swallow  Therapeutic Swallow Intervention : PO Trials, Caregiver Education  Solid Diet Recommendations: Soft & bite sized/chopped (IDDSI Level 6)  Liquid Diet Recommendations: Thin (IDDSI Level 0)  Swallow Comments: Ongoing diet texture analysis and clinical swallow assessment was completed. Pt consumed soft/bite-sized solids with increased textural complexity compared to baseline (protein waffle, leanne crackers with pudding) via self-feeding. Pt again benefited from encouragement and positive reinforcement to improve intake of textured solids. Pt drank 8oz of thin liquid  via cup sips.  ORAL PHASE: Natural dentition in fair-good condition. Tongue thrust again noted. Mastication of soft and regular solids was mild-moderately prolonged and labored, with multiple dry swallows and/or liquid washes required to clear oral residuals at times (pt completed these independently with increased time).  PHARYNGEAL PHASE: Laryngeal elevation appeared adequate to visualization and palpation, however adequacy of hyolaryngeal elevation/excursion cannot be determined with clinical observation. No immediate or delayed s/sx aspiration were observed with any consistencies; immediate dry throat clear was observed intermittently after liquids and solids this date.    Oral motor exercise:  Given direct models and verbal encouragement, pt completed simple oral motor exercises (lingual protrusion/lateralization/elevation, lingual protrusion/lateralization against resistance, labial seal, and labial pucker/retraction, x1-2 trials each. Accuracy was fair.        Goals:  LTG#1:Pt will consume 100% of nutrition, hydration, and medication by mouth without changes in pulmonary status.              Status: Progressing              Progress this date: 3/27/24: Pt ate 100% of meal during session. Pt continues to receive some tube feedings at home.   STG#1:Pt will consume current diet (mechanical soft solids and thin liquids) without overt s/sx aspiration/penetration >95% of the time during PO trials with SLP as well as per parent report.              Status: Goal met  STG#2:Pt will consume trials of upgraded solid/liquid textures without overt s/sx aspiration/penetration in order for consideration of diet texture advancement.              Status: Progressing              Progress this date: 3/27/24: Pt required encouragement, but took small bites of soft/regular texture solids without s/sx asp/pen  STG#3:Pt will complete MBSS for objective assessment of current swallow function, safest/least restrictive diet, and any  effective compensatory strategies.              Status: No progress made              Progress this date: 3/27/24: Pt's mom reported she would like to schedule MBS Study after progress this past week and this session.  STG#4: Pt will complete oral motor exercises with 75% acc given mod cues in order to improve safety and efficiency of mastication and oral clearance.   Status: Progressing   Progress this date: 3/27/24: pt completed with fair acc given min cues; unclear if enough effort was elicited to make sufficient gains

## 2024-03-29 DIAGNOSIS — K59.04 CHRONIC IDIOPATHIC CONSTIPATION: Primary | ICD-10-CM

## 2024-03-30 RX ORDER — DOCUSATE SODIUM 50 MG/5ML
50 LIQUID ORAL DAILY
Qty: 100 ML | Refills: 2 | Status: SHIPPED | OUTPATIENT
Start: 2024-03-30 | End: 2024-04-03 | Stop reason: SDUPTHER

## 2024-04-02 NOTE — PROGRESS NOTES
Subjective   Patient ID: Jalen Blackwell is a 48 y.o. male who presents for Follow-up (6 month follow up ).    HPI     Patient is is eating more. Had grilled cheese and leanne crackers with pudding.   Following with Dr. Gamboa at [facility] for tardive dyskinesia - getting Botox . Patient is able to hold his head up better than previous.   No longer taking Prozac- weaned off by pysch. Mood has been good/OCD might be a little worse but manageable. Clonazepam does help.        Review of Systems   All other systems reviewed and are negative.      Objective   BP 93/63   Pulse 80   Wt 54.4 kg (120 lb)   SpO2 100%   BMI 21.95 kg/m²     Physical Exam  Constitutional:       Appearance: Normal appearance.      Comments: Has developmental delay    HENT:      Head: Normocephalic and atraumatic.   Cardiovascular:      Rate and Rhythm: Normal rate and regular rhythm.      Heart sounds: Normal heart sounds. No murmur heard.     No gallop.   Pulmonary:      Effort: Pulmonary effort is normal. No respiratory distress.      Breath sounds: No wheezing or rales.   Skin:     General: Skin is warm and dry.      Findings: No rash.   Neurological:      Mental Status: He is alert. Mental status is at baseline.   Psychiatric:         Mood and Affect: Mood normal.         Behavior: Behavior normal.      Comments: Has flat affect          Assessment/Plan       #Anxiety   -Following with psych, no longer taking fluoxetine but continues to take clonazepam.      #Aspiration pneumonia   -now s/p PEG. Cont SLP - diet regimen per their recs   -Follows with wound care for PEG care      #Chronic hypotension   -Following with Dr. Baker . Cont Midodrine 10mg TID     #GERD  -Sx stable. Recent EGD 7/16/21 with reflux esophagitis (no Barretts on path). Cont PPI daily, famotidine qhs and Gaviscon.     #Tardive dyskinesia, drug induced parkinsonism   -Following with neurology at [facility]     #hypothyroidism   -Cont levothyroxine, check TSH      #Constipation   -Rx  docusate 100mg BID liquid      Influenza: 10/2/2023  COVID: x2   Prevnar 13: Never  Pneumovax 23: Never  Shingrix: Never   CRC: [id number] neg   PSA: 10/4/23--family hx of prostate ca   Lung ca screening: NI   AAA: NI         RTC 6 mo

## 2024-04-03 ENCOUNTER — OFFICE VISIT (OUTPATIENT)
Dept: PRIMARY CARE | Facility: CLINIC | Age: 49
End: 2024-04-03
Payer: MEDICARE

## 2024-04-03 VITALS
BODY MASS INDEX: 21.95 KG/M2 | SYSTOLIC BLOOD PRESSURE: 93 MMHG | HEART RATE: 80 BPM | DIASTOLIC BLOOD PRESSURE: 63 MMHG | WEIGHT: 120 LBS | OXYGEN SATURATION: 100 %

## 2024-04-03 DIAGNOSIS — E03.9 HYPOTHYROIDISM, UNSPECIFIED TYPE: Primary | ICD-10-CM

## 2024-04-03 DIAGNOSIS — K59.04 CHRONIC IDIOPATHIC CONSTIPATION: ICD-10-CM

## 2024-04-03 PROCEDURE — 1036F TOBACCO NON-USER: CPT | Performed by: INTERNAL MEDICINE

## 2024-04-03 PROCEDURE — 99213 OFFICE O/P EST LOW 20 MIN: CPT | Performed by: INTERNAL MEDICINE

## 2024-04-03 PROCEDURE — 3008F BODY MASS INDEX DOCD: CPT | Performed by: INTERNAL MEDICINE

## 2024-04-03 RX ORDER — DOCUSATE SODIUM 50 MG/5ML
50 LIQUID ORAL DAILY
Qty: 473 ML | Refills: 5 | Status: SHIPPED | OUTPATIENT
Start: 2024-04-03

## 2024-04-03 NOTE — TELEPHONE ENCOUNTER
Requested Prescriptions     Pending Prescriptions Disp Refills    docusate sodium (Colace) 50 mg/5 mL oral liquid 473 mL 5     Sig: Take 5 mL (50 mg) by mouth once daily.

## 2024-04-08 ENCOUNTER — CLINICAL SUPPORT (OUTPATIENT)
Dept: WOUND CARE | Facility: HOSPITAL | Age: 49
End: 2024-04-08
Payer: MEDICARE

## 2024-04-08 PROCEDURE — 17250 CHEM CAUT OF GRANLTJ TISSUE: CPT

## 2024-04-09 DIAGNOSIS — Z00.00 ENCOUNTER FOR GENERAL ADULT MEDICAL EXAMINATION WITHOUT ABNORMAL FINDINGS: ICD-10-CM

## 2024-04-10 ENCOUNTER — HOSPITAL ENCOUNTER (OUTPATIENT)
Dept: RADIOLOGY | Facility: HOSPITAL | Age: 49
Discharge: HOME | End: 2024-04-10
Payer: MEDICARE

## 2024-04-10 DIAGNOSIS — R13.12 OROPHARYNGEAL DYSPHAGIA: ICD-10-CM

## 2024-04-10 PROCEDURE — 74230 X-RAY XM SWLNG FUNCJ C+: CPT

## 2024-04-10 PROCEDURE — 92611 MOTION FLUOROSCOPY/SWALLOW: CPT | Mod: GN | Performed by: PHARMACIST

## 2024-04-10 PROCEDURE — 3430000001 HC RX 343 DIAGNOSTIC RADIOPHARMACEUTICALS: Performed by: INTERNAL MEDICINE

## 2024-04-10 PROCEDURE — 2500000001 HC RX 250 WO HCPCS SELF ADMINISTERED DRUGS (ALT 637 FOR MEDICARE OP): Performed by: INTERNAL MEDICINE

## 2024-04-10 PROCEDURE — 74230 X-RAY XM SWLNG FUNCJ C+: CPT | Performed by: RADIOLOGY

## 2024-04-10 RX ADMIN — BARIUM SULFATE 105 ML: 0.81 POWDER, FOR SUSPENSION ORAL at 14:37

## 2024-04-10 RX ADMIN — BARIUM SULFATE 10 ML: 400 PASTE ORAL at 14:36

## 2024-04-10 RX ADMIN — BARIUM SULFATE 30 ML: 400 SUSPENSION ORAL at 14:36

## 2024-04-10 RX ADMIN — BARIUM SULFATE 5 ML: 400 SUSPENSION ORAL at 14:36

## 2024-04-10 NOTE — PROCEDURES
"    Modified Barium Swallow Study     Patient Name: Jalen Blackwell  MRN: [id number]  : 1975  Today's Date: 04/10/24   Start time:  1400  Stop time:  1455  Time calculation (min) :  55 min     Recommendations:  Regular texture solids and thin liquids. Continue to cut foods into bite-sized pieces or ensure pt takes small bites. Add extra moisture to dry/crumbly solids as needed. Use slow rate and complete multiple swallows per bolus as needed. Alternate bites and sips. Continue to provide supervision during all PO intake. Continue to complete thorough oral care after PO intake. Given that pt currently meets majority of nutrition/hydration needs by mouth, and as his parents feel comfortable supplementing current intake with additional food/drink/protein drinks as needed to maintain weight, recommend removal of G-tube at this time. Pt's mom verbalizes agreement to seek G-tube removal.     Assessment/Impression:    Full detailed SLP/Radiologist Modified Barium Swallow study report can be found under Chart Review tab, Imaging tab and  titled \"[place] Modified Barium Swallow Study\"      Pt. Presenting with mild oropharyngeal dysphagia characterized by labored/disorganized mastication and bolus formation, as well as mild pharyngeal residuals after solids.     Plan:  SLP Plan: Continue dysphagia tx at discretion of treating SLP. Consider ongoing oral motor exercises to the extent that pt can tolerate.    Pain:   Rating 0-10: 0     Education:   Pt's mom viewed flouro images while SLP educated re: swallow anatomy/physiology, results of study, recommended diet modifications, and recommended safe swallow strategies.  "

## 2024-04-11 DIAGNOSIS — R13.10 DYSPHAGIA, UNSPECIFIED TYPE: Primary | ICD-10-CM

## 2024-04-11 NOTE — TELEPHONE ENCOUNTER
Requested Prescriptions     Pending Prescriptions Disp Refills    cyanocobalamin (Vitamin B-12) 250 mcg tablet [Pharmacy Med Name: VITAMIN B-12 250 MCG TABLET] 90 tablet 3     Sig: TAKE 1 TABLET BY MOUTH EVERY DAY AS DIRECTED

## 2024-04-12 RX ORDER — CYANOCOBALAMIN (VITAMIN B-12) 250 MCG
TABLET ORAL
Qty: 90 TABLET | Refills: 3 | Status: SHIPPED | OUTPATIENT
Start: 2024-04-12

## 2024-04-14 DIAGNOSIS — G24.01 TARDIVE DYSTONIA: ICD-10-CM

## 2024-04-14 DIAGNOSIS — F41.9 ANXIETY: ICD-10-CM

## 2024-04-14 RX ORDER — CLONAZEPAM 0.5 MG/1
0.5 TABLET ORAL 3 TIMES DAILY
Qty: 90 TABLET | Refills: 0 | Status: SHIPPED | OUTPATIENT
Start: 2024-04-14 | End: 2024-05-09 | Stop reason: SDUPTHER

## 2024-04-14 RX ORDER — CLONAZEPAM 0.25 MG/1
0.25 TABLET, ORALLY DISINTEGRATING ORAL 2 TIMES DAILY PRN
Qty: 60 TABLET | Refills: 0 | Status: SHIPPED | OUTPATIENT
Start: 2024-04-14 | End: 2024-05-09 | Stop reason: SDUPTHER

## 2024-04-14 NOTE — PROGRESS NOTES
Jalen has passed his swallow evaluation and will be able to cease reliance on the g-tube.  This change has increased his anxiety.  Mom is also noticing some recurrence of O/C behaviors now that he has been completely off the fluoxetine for some time.  We agree to increase scheduled clonazepam slightly to 0.5mg three times daily, and I will send a separate script for clonazepam ODT 0.25mg to be used as-needed for additional breakthrough anxiety.  We have follow-up scheduled for 5/09/24.

## 2024-04-16 ENCOUNTER — APPOINTMENT (OUTPATIENT)
Dept: SPEECH THERAPY | Facility: HOSPITAL | Age: 49
End: 2024-04-16
Payer: MEDICARE

## 2024-04-16 ENCOUNTER — OFFICE VISIT (OUTPATIENT)
Dept: SURGERY | Facility: CLINIC | Age: 49
End: 2024-04-16
Payer: MEDICARE

## 2024-04-16 DIAGNOSIS — R13.10 DYSPHAGIA, UNSPECIFIED TYPE: ICD-10-CM

## 2024-04-16 PROCEDURE — 10120 INC&RMVL FB SUBQ TISS SMPL: CPT | Performed by: SURGERY

## 2024-04-16 PROCEDURE — 3008F BODY MASS INDEX DOCD: CPT | Performed by: SURGERY

## 2024-04-16 PROCEDURE — 99212 OFFICE O/P EST SF 10 MIN: CPT | Performed by: SURGERY

## 2024-04-16 NOTE — PROGRESS NOTES
General Surgery History and Physical    Referring Provider:  DO Amadeo Small Peter E, DO     Chief Complaint:  Chief Complaint   Patient presents with    New Patient Visit     Eval for peg tube removal        History of Present Illness:  Jalen Blackwell is a 48 y.o. male   History of down's syndrome  Had PEG tube placement in last Feb for dysphagia and failure to thrive. Had some problem with granulation tissue bleeding around the tube. It was cared at [facility].   Currently passed speech evaluation and swallow study.   Eating normally.   Requested PEG tube removal.     Past Medical History:  No past medical history on file.     Past Surgical History:  Past Surgical History:   Procedure Laterality Date    OTHER SURGICAL HISTORY  08/11/2021    Tonsillectomy with adenoidectomy    OTHER SURGICAL HISTORY  08/19/2021    Cholecystectomy        Medications:  Current Outpatient Medications   Medication Instructions    cholecalciferol (VITAMIN D3) 1,000 Units, oral, Daily    clobetasol (Temovate) 0.05 % external solution 0.05 Applications, Topical, 2 times daily    clonazePAM (KLONOPIN) 0.25 mg, oral, 2 times daily PRN    clonazePAM (KLONOPIN) 0.5 mg, oral, 3 times daily    cyanocobalamin (Vitamin B-12) 250 mcg tablet TAKE 1 TABLET BY MOUTH EVERY DAY AS DIRECTED    docusate sodium (COLACE) 50 mg, oral, Daily    famotidine (Pepcid) 20 mg tablet TAKE 1 TABLET BY MOUTH EVERY DAY AT BEDTIME AS NEEDED    hydrocortisone 2.5 % cream Topical, 2 times daily, to affected area    ketoconazole (NIZOral) 2 % shampoo APPLY TO SCALP AS DIRECTED.    levothyroxine (SYNTHROID, LEVOXYL) 125 mcg, oral, Daily before breakfast    melatonin 5 mg tablet 1 tablet, oral, Nightly    midodrine (Proamatine) 10 mg tablet 1 tablet, oral, 3 times daily    multivitamin (Daily-Ricardo, with folic acid,) tablet 1 tablet, oral, Daily    omeprazole (PRILOSEC) 40 mg, oral, Daily    psyllium (Metamucil) powder Take 2 teaspoons in 8 oz. Liquid daily in the  morning- one month supply    sodium chloride 1,000 mg tablet 1 tablet, oral, 2 times daily    triamcinolone (Kenalog) 0.1 % cream 1 Film, Topical, 2 times daily        Allergies:  No Known Allergies     Family History:  Family History   Problem Relation Name Age of Onset    Coronary artery disease Mother      Restless legs syndrome Mother      Prostate cancer Father      Sleep apnea Father's Brother      Prostate cancer Father's Brother      Prostate cancer Maternal Grandfather          Social History:  Social History     Socioeconomic History    Marital status: Single     Spouse name: Not on file    Number of children: Not on file    Years of education: Not on file    Highest education level: Not on file   Occupational History    Not on file   Tobacco Use    Smoking status: Never    Smokeless tobacco: Never   Vaping Use    Vaping status: Never Used   Substance and Sexual Activity    Alcohol use: Never    Drug use: Never    Sexual activity: Not on file   Other Topics Concern    Not on file   Social History Narrative    Not on file     Social Determinants of Health     Financial Resource Strain: Low Risk  (1/27/2021)    Received from [facility]    Overall Financial Resource Strain (CARDIA)     Difficulty of Paying Living Expenses: Not hard at all   Food Insecurity: No Food Insecurity (1/27/2021)    Received from [facility]    Hunger Vital Sign     Worried About Running Out of Food in the Last Year: Never true     Ran Out of Food in the Last Year: Never true   Transportation Needs: No Transportation Needs (1/27/2021)    Received from [facility]    PRAPARE - Transportation     Lack of Transportation (Medical): No     Lack of Transportation (Non-Medical): No   Physical Activity: Unknown (1/27/2021)    Received from [facility]    Exercise Vital Sign     Days of Exercise per Week: Patient declined     Minutes of Exercise per Session: Patient declined   Stress: No Stress Concern Present (1/27/2021)     Received from [facility]    [place] [employer] - Occupational Stress Questionnaire     Feeling of Stress : Not at all   Social Connections: Unknown (1/27/2021)    Received from [facility]    Social Connection and Isolation Panel [NHANES]     Frequency of Communication with Friends and Family: More than three times a week     Frequency of Social Gatherings with Friends and Family: Patient declined     Attends [employer] Services: Patient declined     Active Member of Clubs or Organizations: Not on file     Attends Club or Organization Meetings: Patient declined     Marital Status: Patient declined   Intimate Partner Violence: Not on file   Housing Stability: Low Risk  (1/27/2021)    Received from [facility]    Housing Stability Vital Sign     Unable to Pay for Housing in the Last Year: No     Number of Places Lived in the Last Year: 1     Unstable Housing in the Last Year: No        Review of Systems:  A complete 12 point review of systems was performed. Please see scanned questionnaire and is otherwise negative except as noted in the history of present illness.    Vital Signs:  There were no vitals filed for this visit.   There is no height or weight on file to calculate BMI.      Physical Exam:  General: No acute distress.   Neuro: Alert and oriented ×3. Follows commands.  Face: Atraumatic, no visible skin lesion.   Head: Atraumatic  Eyes: Pupils equal reactive to light. Extraocular motions intact.  Ears: Hears normal speaking voice.  Mouth, Nose, Throat: Mucous membranes moist.  Normal dentition.  Neck: Supple. No visible masses.  Breast: Not examined.   Lung: Patent airway and no labored breath.   Vascular: Palpable radial pulses bilaterally.  Abdomen: Soft, NT,ND. PEG tube in place. No bleeding or leaking   Rectal and Perianal: Not examined.   Genitourinary: Not examined.   Musculoskeletal: Moves all extremities.  Normal range of motion.  Skin: No rashes or  lesions.  Psychological: Normal affect      Laboratory Values:  CBC:   Lab Results   Component Value Date    WBC 4.2 (L) 10/04/2023    RBC 4.40 (L) 10/04/2023    HGB 15.0 10/04/2023    HCT 43.8 10/04/2023     10/04/2023       RFP:   Lab Results   Component Value Date     10/04/2023    K 3.9 10/04/2023     10/04/2023    CO2 29 10/04/2023    BUN 11 10/04/2023    CREATININE 0.87 10/04/2023    CALCIUM 9.1 10/04/2023    MG 1.98 02/10/2023    PHOS 3.1 02/10/2023        LFTs:   Lab Results   Component Value Date    PROT 6.7 10/04/2023    ALBUMIN 3.7 10/04/2023    BILITOT 0.5 10/04/2023    BILIDIR 0.2 07/06/2021    ALKPHOS 51 10/04/2023    AST 19 10/04/2023    ALT 17 10/04/2023            Imaging:  I have personally reviewed the images and the radiologist's report.  FL modified barium swallow study    Result Date: 4/10/2024  Interpreted By:  Raul Rodriguez and Schmitz Kimberly STUDY: FL MODIFIED BARIUM SWALLOW STUDY;; 4/10/2024 2:34 pm   INDICATION: Signs/Symptoms:aspiration.   COMPARISON: None.   ACCESSION NUMBER(S): [id number]   ORDERING CLINICIAN: RAUL CLAY   TECHNIQUE: MBSS completed. Informed verbal consent obtained prior to completion of exam. Trials of thin, nectar thick, honey thick, puree, soft-solids, and regular solids given. Unable to adhere to  MBSS protocol due to pt's cognitive impairment and mobility limitations. Fluoroscopy time:  3 minutes 5 seconds.   SLP: Shivani Solorio CCC-SLP Phone/Pager: Available via secure chat   SPEECH FINDINGS: Reason for referral: Recommended by outpatient SLP due to hx of pharyngeal dysphagia and aspiration; determine current swallow status, aspiration risk, safest/least restrictive diet, and any effective compensatory swallow strategies. Patient hx: Down's syndrome, allergic rhinitis, anxiety, cervical dystonia, GERD without esophagitis, LAUREL, tardive dyskinesia/tardive dystonia ~2020, drug-induced parkinsonism, aspiration PNA s/p PEG tube placement  Respiratory status: WFL Previous diet: Recommendation after 2/6/23 MBS was for nutrition/hydration via PEG tube, with small pleasure feedings of soft/chopped solids and honey consistency liquids. Pt was gradually upgraded to soft/chopped solids and thin liquids with single cup sips by outpatient SLP. Pt currently receives 0-1 tube feedings per day to supplement oral intake   FINAL SPEECH RECOMMENDATIONS   Diet recommendations/feeding strategies: Regular texture solids and thin liquids. Continue to cut foods into bite-sized pieces or ensure pt takes small bites. Add extra moisture to dry/crumbly solids as needed. Use slow rate and complete multiple swallows per bolus as needed. Alternate bites and sips. Continue to provide supervision during all PO intake. Continue to complete thorough oral care after PO intake. Given that pt currently meets majority of nutrition/hydration needs by mouth, and as his parents feel comfortable supplementing current intake with additional food/drink/protein drinks as needed to maintain weight, recommend removal of G-tube at this time. Pt's mom verbalizes agreement to seek G-tube removal.   Follow-up speech therapy recommended: Per discretion of treating SLP.   Education provided: Yes. Pt's mom viewed flouro images while SLP educated re: swallow anatomy/physiology, results of study, recommended diet modifications, and recommended safe swallow strategies.   Additional consult suggested: Recommend GI consult for potential removal of PEG tube.   Repeat study/ dc plan: No repeat MBSS recommended at this time. Repeat as clinically warranted if symptoms of oropharyngeal dysphagia worsen.   Mechanics of the swallow summary: *Oral phase: Natural dentition present. Labial seal was adequate. Mastication of solids was disorganized, with a combination of munching-type movements and rotary movements. Tongue thrust and piecemeal deglutition observed. Bolus preparation was prolonged and labored, but  adequate with increased time. A/P transit was adequate with increased time. Premature spillage of solids to the valleculae prior to the swallow. Trace oral residuals were seen. *Pharyngeal phase: Anatomy/positioning: Cervical spine in extended position. Small bony prominence seen at C5-C6. Timing: Swallow onset was adequately prompt in some trials and delayed with bolus head in valleculae in other trials. Strength/ROM: Tongue base retraction was present and appeared mildly diminished. Pharyngeal constriction was present and appeared complete. Hyolaryngeal elevation/excursion appeared adequate. Epiglottic inversion was incomplete. Duration and distention of UES opening appeared mildly diminished. Residue: Min residue was seen in the valleculae and pyriform sinuses after pudding, soft solids, and regular solids. Pt was partially sensate to residue and reduced to trace-min with spontaneous dry swallows. Residue was further reduced to trace amount with liquid wash. Airway protection: Laryngeal vestibular closure was achieved. No penetration or aspiration were seen. *Esophageal phase: Not screened due to cognitive/mobility impairment.   SLP impressions with severity rating: Mild oropharyngeal dysphagia characterized by labored/disorganized mastication and bolus formation, as well as mild pharyngeal residuals after solids.   Thin Liquids (MBSS) Rosenbek's Penetration Aspiration Scale, Thin Liquids (MBSS): 1. NO ASPIRATION & NO PENETRATION - no aspiration, contrast does not enter airway     Nectar Thick Liquids (MBSS) Rosenbek's Penetration Aspiration Scale, Nectar thick liquids (MBSS): 1. NO ASPIRATION & NO PENETRATION - no aspiration, contrast does not enter airway     Honey Thick Liquids (MBSS) Rosenbek's Penetration Aspiration Scale, Honey thick liquids (MBSS): 1. NO ASPIRATION & NO PENETRATION - no aspiration, contrast does not enter airway       Purees (MBSS) Rosenbek's Penetration Aspiration Scale, Purees (MBSS): 1.  NO ASPIRATION & NO PENETRATION - no aspiration, contrast does not enter airway     Solids (MBSS) Rosenbek's Penetration Aspiration Scale, Solids (MBSS): 1. NO ASPIRATION & NO PENETRATION - no aspiration, contrast does not enter airway     Speech Therapy section of this report signed by Shivani Sloorio CCC-SLP on 4/10/2024 at 4:17 pm.   RADIOLOGY FINDINGS: There is anterior spurring C5-C6 which may account for dysphagia.   Radiology section of this report signed by Raul Rodriguez M.D..       Please refer to speech pathologist's report for additional details and treatment recommendations.   MACRO: None   Signed by: Raul Rodriguez 4/10/2024 5:12 PM Dictation workstation:   [id number]        Assessment:  This is a 48 y.o. male who presents with   Down's syndrome  History of PEG tube for dysphagia and failure to thrive   Currently eating normally   No need for PEG tube per PCP and speech     Plan:  PEG tube removed in office: patient was lying supine on bed, no local anesthetics was needed, tube removed with no problem. Dressing was applied.   Ok to go home after lying supine for 10 minutes.   Change dressing daily or when it is wet  Patient has follow up appointment in wound center on Monday.   Instructed to call if bleeding, persistent leaking or any concern   Ok to eat this afternoon: small meals first and advance if no leaking     Liming MD [facility]  General Surgery  Office: [phone]  Fax:     [phone]  2:06 PM   04/16/24

## 2024-04-22 ENCOUNTER — CLINICAL SUPPORT (OUTPATIENT)
Dept: WOUND CARE | Facility: HOSPITAL | Age: 49
End: 2024-04-22
Payer: MEDICARE

## 2024-04-22 PROCEDURE — 11042 DBRDMT SUBQ TIS 1ST 20SQCM/<: CPT

## 2024-04-29 ENCOUNTER — CLINICAL SUPPORT (OUTPATIENT)
Dept: WOUND CARE | Facility: HOSPITAL | Age: 49
End: 2024-04-29
Payer: MEDICARE

## 2024-04-29 PROCEDURE — 99212 OFFICE O/P EST SF 10 MIN: CPT

## 2024-04-30 ENCOUNTER — LAB (OUTPATIENT)
Dept: LAB | Facility: LAB | Age: 49
End: 2024-04-30
Payer: MEDICARE

## 2024-04-30 DIAGNOSIS — E03.9 HYPOTHYROIDISM, UNSPECIFIED TYPE: ICD-10-CM

## 2024-04-30 LAB — TSH SERPL-ACNC: 0.68 MIU/L (ref 0.44–3.98)

## 2024-04-30 PROCEDURE — 36415 COLL VENOUS BLD VENIPUNCTURE: CPT

## 2024-04-30 PROCEDURE — 84443 ASSAY THYROID STIM HORMONE: CPT

## 2024-05-01 ENCOUNTER — TREATMENT (OUTPATIENT)
Dept: SPEECH THERAPY | Facility: HOSPITAL | Age: 49
End: 2024-05-01
Payer: MEDICARE

## 2024-05-01 DIAGNOSIS — E03.9 HYPOTHYROIDISM, UNSPECIFIED TYPE: Primary | ICD-10-CM

## 2024-05-01 DIAGNOSIS — R13.12 OROPHARYNGEAL DYSPHAGIA: ICD-10-CM

## 2024-05-01 PROCEDURE — 92526 ORAL FUNCTION THERAPY: CPT | Mod: GN | Performed by: SPEECH-LANGUAGE PATHOLOGIST

## 2024-05-01 ASSESSMENT — PAIN - FUNCTIONAL ASSESSMENT: PAIN_FUNCTIONAL_ASSESSMENT: 0-10

## 2024-05-01 ASSESSMENT — PAIN SCALES - GENERAL: PAINLEVEL_OUTOF10: 0 - NO PAIN

## 2024-05-01 NOTE — PROGRESS NOTES
Speech-Language Pathology Clinical Swallow Treatment     Patient Name: Jalen Blackwell  MRN: [id number]  : 1975  Today's Date: 24  Start time:  1030  Stop time:  1123  Time calculation (min) :  53     ASSESSMENT  SLP TX Intervention Outcome: Making Progress Towards Goals  Treatment Tolerance: Patient tolerated treatment well     PLAN  Recommended solid consistency: Regular (IDDSI level 7)  Recommended liquid consistency: Thin (IDDSI 0)  Recommended medication administration: Crushed, in puree  Safe swallow strategies: Upright positioning for all PO intake, Slow rate of intake, Chew thoroughly, Do not feed unless pt is fully alert and upright, small bites/sips  Inpatient/Swing Bed or Outpatient: Outpatient  SLP TX Plan: Continue Plan of Care  SLP Plan: Skilled SLP  SLP Frequency: 1x per week (or every other week)     SUBJECTIVE  Respiratory status: Room air  Positioning: Upright in wheelchair  Pt was alert, pleasant, and cooperative for session.     Pain Assessment: 0-10  Pain Score: 0 - No pain        OBJECTIVE  Therapeutic Swallow  Therapeutic Swallow Intervention : PO Trials, Caregiver Education  Solid Diet Recommendations: Soft & bite sized/chopped (IDDSI Level 6)  Liquid Diet Recommendations: Thin (IDDSI Level 0)  Swallow Comments: Ongoing diet texture analysis and clinical swallow assessment was completed. Pt consumed soft/bite-sized solids with increased textural complexity compared to baseline (protein waffle, leanne crackers with pudding) via self-feeding. Pt again benefited from encouragement and positive reinforcement to improve intake of textured solids. Pt drank 8oz of thin liquid via cup sips.  ORAL PHASE: Natural dentition in fair-good condition. Tongue thrust again noted. Mastication of soft and regular solids was mild-moderately prolonged and labored, with multiple dry swallows and/or liquid washes required to clear oral residuals at times (pt completed these independently with increased  time).  PHARYNGEAL PHASE: Laryngeal elevation appeared adequate to visualization and palpation, however adequacy of hyolaryngeal elevation/excursion cannot be determined with clinical observation. No immediate or delayed s/sx aspiration were observed with any consistencies; immediate dry throat clear was observed intermittently after liquids and solids this date.    Oral motor exercise:  Given direct models and verbal encouragement, pt completed simple oral motor exercises (lingual protrusion/lateralization/elevation, lingual protrusion/lateralization against resistance, labial seal, and labial pucker/retraction, x1-2 trials each. Accuracy was fair.        Goals:  LTG#1:Pt will consume 100% of nutrition, hydration, and medication by mouth without changes in pulmonary status.              Status: Progressing              Progress this date: 5/1/24: Pt ate 75% of meal during session. Pt continues to receive some tube feedings at home.   STG#1:Pt will consume current diet (mechanical soft solids and thin liquids) without overt s/sx aspiration/penetration >95% of the time during PO trials with SLP as well as per parent report.              Status: Goal met  STG#2:Pt will consume trials of upgraded solid/liquid textures without overt s/sx aspiration/penetration in order for consideration of diet texture advancement.              Status: Progressing              Progress this date: 5/1/24: Pt required encouragement, but took small bites of regular texture solids without s/sx asp/pen  STG#3:Pt will complete MBSS for objective assessment of current swallow function, safest/least restrictive diet, and any effective compensatory strategies.              Status: Goal met  STG#4: Pt will complete oral motor exercises with 75% acc given mod cues in order to improve safety and efficiency of mastication and oral clearance.   Status: Progressing   Progress this date: 5/1/24: pt completed with fair acc given min cues; unclear if  enough effort was elicited to make sufficient gains

## 2024-05-09 ENCOUNTER — OFFICE VISIT (OUTPATIENT)
Dept: BEHAVIORAL HEALTH | Facility: CLINIC | Age: 49
End: 2024-05-09
Payer: MEDICARE

## 2024-05-09 DIAGNOSIS — F79 INTELLECTUAL DISABILITY: ICD-10-CM

## 2024-05-09 DIAGNOSIS — F42.2 MIXED OBSESSIONAL THOUGHTS AND ACTS: ICD-10-CM

## 2024-05-09 DIAGNOSIS — Z79.899 HIGH RISK MEDICATION USE: ICD-10-CM

## 2024-05-09 DIAGNOSIS — G24.01 TARDIVE DYSKINESIA: ICD-10-CM

## 2024-05-09 DIAGNOSIS — G24.01 TARDIVE DYSTONIA: ICD-10-CM

## 2024-05-09 DIAGNOSIS — Q90.9 DOWN SYNDROME (HHS-HCC): ICD-10-CM

## 2024-05-09 DIAGNOSIS — F41.9 ANXIETY: Primary | ICD-10-CM

## 2024-05-09 PROCEDURE — 3008F BODY MASS INDEX DOCD: CPT | Performed by: PSYCHIATRY & NEUROLOGY

## 2024-05-09 PROCEDURE — 99215 OFFICE O/P EST HI 40 MIN: CPT | Performed by: PSYCHIATRY & NEUROLOGY

## 2024-05-09 PROCEDURE — 1036F TOBACCO NON-USER: CPT | Performed by: PSYCHIATRY & NEUROLOGY

## 2024-05-09 RX ORDER — CLONAZEPAM 0.5 MG/1
0.5 TABLET ORAL 3 TIMES DAILY
Qty: 90 TABLET | Refills: 2 | Status: SHIPPED | OUTPATIENT
Start: 2024-05-09

## 2024-05-09 RX ORDER — CLONAZEPAM 0.25 MG/1
0.25 TABLET, ORALLY DISINTEGRATING ORAL 2 TIMES DAILY PRN
Qty: 60 TABLET | Refills: 0 | Status: SHIPPED | OUTPATIENT
Start: 2024-05-09 | End: 2024-06-08

## 2024-05-09 ASSESSMENT — ENCOUNTER SYMPTOMS
SEIZURES: 0
ACTIVITY CHANGE: 0
ABDOMINAL PAIN: 0
VOMITING: 0
CHOKING: 0
CONSTIPATION: 0
SLEEP DISTURBANCE: 0
DIARRHEA: 0

## 2024-05-09 NOTE — PROGRESS NOTES
Outpatient Psychiatry    A HIPAA-compliant interactive audio and video telecommunication system which permits real time communications between the patient (at the originating site) and provider (at the distant site) was utilized to provide this telehealth service.     The patient, family, caregivers and guardian (as appropriate) have provided consent on this date to conduct treatment via this telehealth service.  The patient's identity and physical location were verified at the time of this visit.      Present for appointment: Jalen and mom/guardian (Jael).    SUBJECTIVE    Overall, Jalen has continued to do really well over the past few months.  He has not had any major health problems since we last met.    They are not actively pursuing Botox treatments right now.  His spasticity is improving gradually.  They might look into acupuncture.    We have increased his routine/scheduled clonazepam by 0.25mg/day (now at 0.5mg three times per day) with an additional 0.25mg to 0.5mg per day as needed to help address anxiety with good results.    He passed his recent swallow evaluation and has had his g-tube removed within the past few weeks.  He continues to work with ST (about every other week) to gradually reintroduce new foods, but this does cause him some anxiety.  He recently had a fish sandwich and cheesecake.    Mom notes some recurrence of O/C rituals re-emerging since being off the fluoxetine (eg, wanting to eat or toilet at very specific or prescribed times during the day with him closely watching the time).  Mom doesn't see these as being extremely interfering or impairing right now.    Mood is pretty good overall.  He does not seem sad or irritable.  Sleeping well at night.    Mom needs to have urgent c-spine fusion surgery done in mid-June so Jalen will have some additional help around the house while she is recovering.    Review of Systems   Constitutional:  Negative for activity change.   HENT:  Negative  for trouble swallowing.    Respiratory:  Negative for choking.    Gastrointestinal:  Negative for abdominal pain, constipation, diarrhea and vomiting.   Musculoskeletal:  Positive for gait problem and neck stiffness.   Neurological:  Negative for seizures.   Psychiatric/Behavioral:  Negative for behavioral problems and sleep disturbance.       Controlled Substance Evaluation  OARRS/PDMP reviewed: Pancho Garcia MD on 5/9/2024 12:49 PM  Is the patient prescribed a combination of a benzodiazepine and opioid? No  I have personally reviewed the OARRS report for Jalen Blackwell.   I have considered the risks of abuse, dependence, addiction and diversion.    I believe that it is clinically appropriate for Jalen Blackwell to be prescribed this medication.    Last Urine Drug Screen:  No results found for this or any previous visit (from the past 8760 hour(s)).    Controlled Substance Agreement: 03/24/2023  Reviewed Controlled Substance Agreement, including but not limited to:  Benefits, risks, and alternatives to treatment with a controlled substance medication(s).    Prescribed Controlled Substances:  > Benzodiazepines: Clonazepam  What is the patient's goal of therapy? Reduced anxiety and spasticity  Is this being achieved with current treatment? Yes  Activities of Daily Living:   Is your overall impression that this patient is benefiting (symptom reduction outweighs side effects) from benzodiazepine therapy? Yes   1. Physical Functioning: Same  2. Family Relationship: Same  3. Social Relationship: Same  4. Mood: Same  5. Sleep Patterns: Same  6. Overall Function: Same     MEDICATION HISTORY  Aripiprazole  Brexpiprazole - caused TD  Fluoxetine - up to 60mg/day  Paroxetine - up to 60mg/day  Ingrezza - caused Parkinsonism  Sinemet - to address Parkinsonian effects of VMAT2 inhibitor    CURRENT MEDICATIONS    Current Outpatient Medications:     cholecalciferol (Vitamin D3) 25 MCG (1000 UT) tablet, Take 1 tablet  (1,000 Units) by mouth once daily., Disp: 90 tablet, Rfl: 3    clobetasol (Temovate) 0.05 % external solution, Apply 0.05 Applications topically 2 times a day., Disp: , Rfl:     clonazePAM (KlonoPIN) 0.25 mg disintegrating tablet, Take 1 tablet (0.25 mg) by mouth 2 times a day as needed for anxiety., Disp: 60 tablet, Rfl: 0    clonazePAM (KlonoPIN) 0.5 mg tablet, Take 1 tablet (0.5 mg) by mouth 3 times a day., Disp: 90 tablet, Rfl: 2    cyanocobalamin (Vitamin B-12) 250 mcg tablet, TAKE 1 TABLET BY MOUTH EVERY DAY AS DIRECTED, Disp: 90 tablet, Rfl: 3    docusate sodium (Colace) 50 mg/5 mL oral liquid, Take 5 mL (50 mg) by mouth once daily., Disp: 473 mL, Rfl: 5    famotidine (Pepcid) 20 mg tablet, TAKE 1 TABLET BY MOUTH EVERY DAY AT BEDTIME AS NEEDED, Disp: 90 tablet, Rfl: 1    hydrocortisone 2.5 % cream, Apply topically 2 times a day. to affected area, Disp: , Rfl:     ketoconazole (NIZOral) 2 % shampoo, APPLY TO SCALP AS DIRECTED., Disp: , Rfl:     levothyroxine (Synthroid, Levoxyl) 125 mcg tablet, TAKE 1 TABLET (125 MCG) BY MOUTH ONCE DAILY IN THE MORNING. TAKE BEFORE MEALS., Disp: 90 tablet, Rfl: 1    melatonin 5 mg tablet, Take 1 tablet (5 mg) by mouth once daily at bedtime., Disp: , Rfl:     midodrine (Proamatine) 10 mg tablet, Take 1 tablet (10 mg) by mouth 3 times a day., Disp: , Rfl:     multivitamin (Daily-Ricardo, with folic acid,) tablet, Take 1 tablet by mouth once daily., Disp: 90 tablet, Rfl: 3    omeprazole (PriLOSEC) 40 mg DR capsule, Take 1 capsule (40 mg) by mouth once daily., Disp: 90 capsule, Rfl: 2    psyllium (Metamucil) powder, Take 2 teaspoons in 8 oz. Liquid daily in the morning- one month supply, Disp: , Rfl:     sodium chloride 1,000 mg tablet, Take 1 tablet (1 g) by mouth 2 times a day., Disp: , Rfl:     triamcinolone (Kenalog) 0.1 % cream, Apply 1 Film topically 2 times a day., Disp: , Rfl:     SOCIAL HISTORY  Living situation Lives with family   Provider agency None currently   Work or  day program None currently   School [employer]   Guardianship Mother (Jael)   SSA ?   Bx Specialist ?   Nicotine None   Alcohol None   Other drugs None     OBJECTIVE    Lab Results   Component Value Date    HGB 15.0 10/04/2023     10/04/2023    NEUTROABS 7.33 02/04/2023    GLUCOSE 103 (H) 10/04/2023     10/04/2023    K 3.9 10/04/2023    CO2 29 10/04/2023    CALCIUM 9.1 10/04/2023    CREATININE 0.87 10/04/2023    AST 19 10/04/2023    ALT 17 10/04/2023    AMMONIA 29 07/06/2021    TSH 0.68 04/30/2024    CHOL 181 10/04/2023    LDLF 127 (H) 01/12/2018    TRIG 135 10/04/2023    [id number] 667 06/17/2022     Therapeutic Drug Monitoring  N/A    Electrocardiograms  07/07/2021: NSR, VR 80, QTc 426    MENTAL STATUS EXAM  General/Appearance: Appropriate dress/hygiene/grooming.  Attitude/Behavior: Actively engages with interview.  Speech/Communication: Normal volume/rate/tone. Some stuttering.  Motor: Involuntary trunk and/or limb movements. Improved posture/stability.  Gait: Not assessed at this visit.  Mood: Neutral.  Affect: Neutral.  Thought processes: [place].  Thought content: Future-oriented.  Perception: Does not appear to be experiencing or responding to hallucinations.  Sensorium/Cognition: Oriented to self and surroundings. Intellectual impairment.  Memory: Recent & remote recall is intact.  Insight: Minimal.  Judgment: Good. Adherent with prescribed/recommended treatment(s).    ASSESSMENT  aJlen has mostly been doing well since our last visit.  Spasticity/dystonia continues to improve, though some O/C behaviors are starting to re-emerge the longer he is off of the fluoxetine.  We could consider the possibility of trying fluvoxamine for OCD if needed.  CSA emailed to parent/guardian and order placed for yearly urine drug screen.    PROBLEM LIST  Intellectual developmental disorder, mild, secondary to Down syndrome  Anxiety  OCD  Tardive dyskinesia    PLAN  -- Continue clonazepam 0.5mg TID for  anxiety and dystonia   -- PRN: clonazepam 0.25mg up to twice daily  -- Follow up 3 months or sooner if needed    Pancho Garcia MD    Prep time on date of the patient encounter: 5 minutes   Time spent directly with patient/family/caregiver: 35 minutes   Additional time spent on patient care activities: 0 minutes   Documentation time: 5 minutes   Other time spent: 0 minutes   Total time on date of patient encounter: 45 minutes

## 2024-05-10 PROBLEM — G24.01 TARDIVE DYSTONIA: Status: ACTIVE | Noted: 2023-02-15

## 2024-05-10 PROBLEM — F42.2 MIXED OBSESSIONAL THOUGHTS AND ACTS: Status: ACTIVE | Noted: 2024-05-10

## 2024-05-10 ASSESSMENT — ENCOUNTER SYMPTOMS
TROUBLE SWALLOWING: 0
NECK STIFFNESS: 1

## 2024-05-15 ENCOUNTER — APPOINTMENT (OUTPATIENT)
Dept: SPEECH THERAPY | Facility: HOSPITAL | Age: 49
End: 2024-05-15
Payer: MEDICARE

## 2024-05-16 ENCOUNTER — APPOINTMENT (OUTPATIENT)
Dept: GASTROENTEROLOGY | Facility: HOSPITAL | Age: 49
End: 2024-05-16
Payer: MEDICARE

## 2024-05-28 ENCOUNTER — TREATMENT (OUTPATIENT)
Dept: SPEECH THERAPY | Facility: HOSPITAL | Age: 49
End: 2024-05-28
Payer: MEDICARE

## 2024-05-28 DIAGNOSIS — R13.12 OROPHARYNGEAL DYSPHAGIA: ICD-10-CM

## 2024-05-28 PROCEDURE — 92526 ORAL FUNCTION THERAPY: CPT | Mod: GN | Performed by: SPEECH-LANGUAGE PATHOLOGIST

## 2024-05-28 ASSESSMENT — PAIN SCALES - GENERAL: PAINLEVEL_OUTOF10: 0 - NO PAIN

## 2024-05-28 ASSESSMENT — PAIN - FUNCTIONAL ASSESSMENT: PAIN_FUNCTIONAL_ASSESSMENT: 0-10

## 2024-05-28 NOTE — PROGRESS NOTES
Speech-Language Pathology Clinical Swallow Treatment     Patient Name: Jalen Blackwell  MRN: [id number]  : 1975  Today's Date: 24  Start time:  1345  Stop time:  1430  Time calculation (min) :  45     ASSESSMENT  SLP TX Intervention Outcome: Making Progress Towards Goals  Treatment Tolerance: Patient tolerated treatment well     PLAN  Recommended solid consistency: Regular (IDDSI level 7)  Recommended liquid consistency: Thin (IDDSI 0)  Recommended medication administration: Crushed, in puree  Safe swallow strategies: Upright positioning for all PO intake, Slow rate of intake, Chew thoroughly, Do not feed unless pt is fully alert and upright, small bites/sips  Inpatient/Swing Bed or Outpatient: Outpatient  SLP TX Plan: Continue Plan of Care  SLP Plan: Skilled SLP  SLP Frequency: 1x per week (or every other week)     SUBJECTIVE  Respiratory status: Room air  Positioning: Upright in wheelchair  Pt was alert, pleasant, and cooperative for session.     Pain Assessment: 0-10  Pain Score: 0 - No pain        OBJECTIVE  Therapeutic Swallow  Therapeutic Swallow Intervention : PO Trials, Caregiver Education  Solid Diet Recommendations: Soft & bite sized/chopped (IDDSI Level 6)  Liquid Diet Recommendations: Thin (IDDSI Level 0)  Swallow Comments: Ongoing diet texture analysis and clinical swallow assessment was completed. Pt consumed soft/bite-sized solids with increased textural complexity compared to baseline (protein waffle, leanne crackers with pudding) via self-feeding. Pt again benefited from encouragement and positive reinforcement to improve intake of textured solids. Pt drank 8oz of thin liquid via cup sips.  ORAL PHASE: Natural dentition in fair-good condition. Tongue thrust again noted. Mastication of soft and regular solids was mild-moderately prolonged and labored, with multiple dry swallows and/or liquid washes required to clear oral residuals at times (pt completed these independently with increased  time).  PHARYNGEAL PHASE: Laryngeal elevation appeared adequate to visualization and palpation, however adequacy of hyolaryngeal elevation/excursion cannot be determined with clinical observation. No immediate or delayed s/sx aspiration were observed with any consistencies; immediate dry throat clear was observed intermittently after liquids and solids this date.    Oral motor exercise:  Given direct models and verbal encouragement, pt completed simple oral motor exercises (lingual protrusion/lateralization/elevation, lingual protrusion/lateralization against resistance, labial seal, and labial pucker/retraction, x1-2 trials each. Accuracy was fair.        Goals:  LTG#1:Pt will consume 100% of nutrition, hydration, and medication by mouth without changes in pulmonary status.              Status: Progressing              Progress this date: 5/28/24: Pt ate 90% of meal during session.  STG#1:Pt will consume current diet (mechanical soft solids and thin liquids) without overt s/sx aspiration/penetration >95% of the time during PO trials with SLP as well as per parent report.              Status: Goal met  STG#2:Pt will consume trials of upgraded solid/liquid textures without overt s/sx aspiration/penetration in order for consideration of diet texture advancement.              Status: Progressing              Progress this date: 5/28/24: Pt required encouragement, but took small bites of regular texture solids without s/sx asp/pen  STG#3:Pt will complete MBSS for objective assessment of current swallow function, safest/least restrictive diet, and any effective compensatory strategies.              Status: Goal met  STG#4: Pt will complete oral motor exercises with 75% acc given mod cues in order to improve safety and efficiency of mastication and oral clearance.   Status: Progressing   Progress this date: 5/28/24: pt completed with fair acc given min cues; unclear if enough effort was elicited to make sufficient gains

## 2024-05-29 ENCOUNTER — LAB (OUTPATIENT)
Dept: LAB | Facility: LAB | Age: 49
End: 2024-05-29
Payer: MEDICARE

## 2024-05-29 DIAGNOSIS — Z79.899 HIGH RISK MEDICATION USE: ICD-10-CM

## 2024-05-31 ENCOUNTER — LAB (OUTPATIENT)
Dept: LAB | Facility: LAB | Age: 49
End: 2024-05-31
Payer: MEDICARE

## 2024-05-31 LAB
AMPHETAMINES UR QL SCN: NORMAL
BARBITURATES UR QL SCN: NORMAL
BZE UR QL SCN: NORMAL
CANNABINOIDS UR QL SCN: NORMAL
CREAT UR-MCNC: 23.5 MG/DL (ref 20–370)
PCP UR QL SCN: NORMAL

## 2024-05-31 PROCEDURE — 82570 ASSAY OF URINE CREATININE: CPT

## 2024-05-31 PROCEDURE — 80373 DRUG SCREENING TRAMADOL: CPT

## 2024-05-31 PROCEDURE — 80346 BENZODIAZEPINES1-12: CPT

## 2024-05-31 PROCEDURE — 80368 SEDATIVE HYPNOTICS: CPT

## 2024-05-31 PROCEDURE — 80361 OPIATES 1 OR MORE: CPT

## 2024-05-31 PROCEDURE — 80354 DRUG SCREENING FENTANYL: CPT

## 2024-05-31 PROCEDURE — 80307 DRUG TEST PRSMV CHEM ANLYZR: CPT

## 2024-05-31 PROCEDURE — 80358 DRUG SCREENING METHADONE: CPT

## 2024-05-31 PROCEDURE — 80365 DRUG SCREENING OXYCODONE: CPT

## 2024-06-04 ENCOUNTER — APPOINTMENT (OUTPATIENT)
Dept: SPEECH THERAPY | Facility: HOSPITAL | Age: 49
End: 2024-06-04
Payer: MEDICARE

## 2024-06-06 LAB
1OH-MIDAZOLAM UR CFM-MCNC: <25 NG/ML
6MAM UR CFM-MCNC: <25 NG/ML
7AMINOCLONAZEPAM UR CFM-MCNC: 159 NG/ML
A-OH ALPRAZ UR CFM-MCNC: <25 NG/ML
ALPRAZ UR CFM-MCNC: <25 NG/ML
CHLORDIAZEP UR CFM-MCNC: <25 NG/ML
CLONAZEPAM UR CFM-MCNC: <25 NG/ML
CODEINE UR CFM-MCNC: <50 NG/ML
DIAZEPAM UR CFM-MCNC: <25 NG/ML
EDDP UR CFM-MCNC: <25 NG/ML
FENTANYL UR CFM-MCNC: <2.5 NG/ML
HYDROCODONE CTO UR CFM-MCNC: <25 NG/ML
HYDROMORPHONE UR CFM-MCNC: <25 NG/ML
LORAZEPAM UR CFM-MCNC: <25 NG/ML
METHADONE UR CFM-MCNC: <25 NG/ML
MIDAZOLAM UR CFM-MCNC: <25 NG/ML
MORPHINE UR CFM-MCNC: <50 NG/ML
NORDIAZEPAM UR CFM-MCNC: <25 NG/ML
NORFENTANYL UR CFM-MCNC: <2.5 NG/ML
NORHYDROCODONE UR CFM-MCNC: <25 NG/ML
NOROXYCODONE UR CFM-MCNC: <25 NG/ML
NORTRAMADOL UR-MCNC: <50 NG/ML
OXAZEPAM UR CFM-MCNC: <25 NG/ML
OXYCODONE UR CFM-MCNC: <25 NG/ML
OXYMORPHONE UR CFM-MCNC: <25 NG/ML
TEMAZEPAM UR CFM-MCNC: <25 NG/ML
TRAMADOL UR CFM-MCNC: <50 NG/ML
ZOLPIDEM UR CFM-MCNC: <25 NG/ML
ZOLPIDEM UR-MCNC: <25 NG/ML

## 2024-08-01 ENCOUNTER — LAB (OUTPATIENT)
Dept: LAB | Facility: LAB | Age: 49
End: 2024-08-01
Payer: MEDICARE

## 2024-08-01 DIAGNOSIS — E03.9 HYPOTHYROIDISM, UNSPECIFIED TYPE: ICD-10-CM

## 2024-08-01 LAB — TSH SERPL-ACNC: 0.1 MIU/L (ref 0.44–3.98)

## 2024-08-01 PROCEDURE — 84443 ASSAY THYROID STIM HORMONE: CPT

## 2024-08-01 PROCEDURE — 36415 COLL VENOUS BLD VENIPUNCTURE: CPT

## 2024-08-03 DIAGNOSIS — E03.9 HYPOTHYROIDISM, UNSPECIFIED TYPE: Primary | ICD-10-CM

## 2024-08-03 RX ORDER — LEVOTHYROXINE SODIUM 112 UG/1
112 TABLET ORAL DAILY
Qty: 90 TABLET | Refills: 1 | Status: SHIPPED | OUTPATIENT
Start: 2024-08-03 | End: 2025-08-03

## 2024-08-07 DIAGNOSIS — R79.89 LOW VITAMIN D LEVEL: ICD-10-CM

## 2024-08-08 ENCOUNTER — APPOINTMENT (OUTPATIENT)
Dept: BEHAVIORAL HEALTH | Facility: CLINIC | Age: 49
End: 2024-08-08
Payer: MEDICARE

## 2024-08-08 DIAGNOSIS — G24.01 TARDIVE DYSKINESIA: ICD-10-CM

## 2024-08-08 DIAGNOSIS — F42.2 MIXED OBSESSIONAL THOUGHTS AND ACTS: Primary | ICD-10-CM

## 2024-08-08 DIAGNOSIS — F41.9 ANXIETY: ICD-10-CM

## 2024-08-08 DIAGNOSIS — F79 INTELLECTUAL DISABILITY: ICD-10-CM

## 2024-08-08 DIAGNOSIS — G24.01 TARDIVE DYSTONIA: ICD-10-CM

## 2024-08-08 DIAGNOSIS — Q90.9 DOWN SYNDROME (HHS-HCC): ICD-10-CM

## 2024-08-08 PROCEDURE — 99215 OFFICE O/P EST HI 40 MIN: CPT | Performed by: PSYCHIATRY & NEUROLOGY

## 2024-08-08 PROCEDURE — G2211 COMPLEX E/M VISIT ADD ON: HCPCS | Performed by: PSYCHIATRY & NEUROLOGY

## 2024-08-08 PROCEDURE — 1036F TOBACCO NON-USER: CPT | Performed by: PSYCHIATRY & NEUROLOGY

## 2024-08-08 RX ORDER — CLONAZEPAM 0.5 MG/1
0.5 TABLET ORAL 3 TIMES DAILY
Qty: 90 TABLET | Refills: 2 | Status: SHIPPED | OUTPATIENT
Start: 2024-08-08

## 2024-08-08 RX ORDER — CHOLECALCIFEROL (VITAMIN D3) 25 MCG
TABLET ORAL DAILY
Qty: 90 TABLET | Refills: 2 | Status: SHIPPED | OUTPATIENT
Start: 2024-08-08

## 2024-08-08 ASSESSMENT — ENCOUNTER SYMPTOMS
TROUBLE SWALLOWING: 0
NECK STIFFNESS: 1
CONSTIPATION: 0
CHOKING: 0
ACTIVITY CHANGE: 0
DIARRHEA: 0
VOMITING: 0
SEIZURES: 0
SLEEP DISTURBANCE: 0
WEAKNESS: 1
NERVOUS/ANXIOUS: 1
ABDOMINAL PAIN: 0

## 2024-08-08 NOTE — PROGRESS NOTES
"Outpatient Adult IDD Psychiatry    A HIPAA-compliant interactive audio and video telecommunication system which permits real time communications between the patient (at the originating site) and provider (at the distant site) was utilized to provide this telehealth service.     The patient, family, caregivers and guardian (as appropriate) have provided consent on this date to conduct treatment via this telehealth service.  The patient's identity and physical location were verified at the time of this visit.      Present for appointment: Jalen and mom/guardian (Jael).    SUBJECTIVE    Since our last check-in mom had her c-spine fusion surgery and was in a rehab facility for three weeks.  She continues to make incremental progress in her recovery, but strength and mobility are quite limited, and she is dealing with significant pain issues requiring use of muscle relaxants and opioid pain medications.    Mom has observed that Jalen seems to have had a progressive increase in his overall mental rigidity and ritualized behaviors since removal of fluoxetine.  Her email ahead of today's visit highlights the following:  He is extremely concerned with what time it is and wants to do everything by the clock - his meds, when he eats, when he goes to the bathroom, etc.  He always wants to know exactly what time we are leaving if we are going somewhere and exactly when we will be coming home. He is also starting to develop some new routines ... He is also terribly concerned about what time his friends are going to call him.  At times he seems anxious and somewhat paranoid or at least oddly concerned that he's done something wrong [like talking back to his caregiver].  In spite of all this he is still pleasant and fairly easy to deal with, but we are concerned that he seems to be possibly on a downward direction.    His overall anxiety and obsessiveness are starting to make it more difficult for him to simply \"enjoy himself\" " in things he usually likes.    He is not currently involved with ST or PT while mom is recovering from surgery.  Progress with expanding his diet (variety) has generally plateaued, though mom suspects he could eat more things than he is willing to try right now.  He is sometimes open to trying different desserts.    Review of Systems   Constitutional:  Negative for activity change.   HENT:  Negative for trouble swallowing.    Respiratory:  Negative for choking.    Gastrointestinal:  Negative for abdominal pain, constipation, diarrhea and vomiting.   Musculoskeletal:  Positive for gait problem and neck stiffness.   Neurological:  Positive for weakness. Negative for seizures and syncope.   Psychiatric/Behavioral:  Negative for behavioral problems and sleep disturbance. The patient is nervous/anxious.       Controlled Substance Evaluation  OARRS/PDMP reviewed: Pancho Garcia MD on 8/8/2024  6:57 AM  Is the patient prescribed a combination of a benzodiazepine and opioid? No  I have personally reviewed the OARRS report for Jalen Blackwell.   I have considered the risks of abuse, dependence, addiction and diversion.    I believe that it is clinically appropriate for Jalen Blackwell to be prescribed this medication.    Last Urine Drug Screen:  Recent Results (from the past 8760 hour(s))   Confirmation Opiate/Opioid/Benzo Prescription Compliance    Collection Time: 05/31/24  2:42 PM   Result Value Ref Range    Clonazepam <25 <25 ng/mL    7-Aminoclonazepam 159 (H) <25 ng/mL    Alprazolam <25 <25 ng/mL    Alpha-Hydroxyalprazolam <25 <25 ng/mL    Midazolam <25 <25 ng/mL    Alpha-Hydroxymidazolam <25 <25 ng/mL    Chlordiazepoxide <25 <25 ng/mL    Diazepam <25 <25 ng/mL    Nordiazepam <25 <25 ng/mL    Temazepam <25 <25 ng/mL    Oxazepam <25 <25 ng/mL    Lorazepam <25 <25 ng/mL    Methadone <25 <25 ng/mL    EDDP <25 <25 ng/mL    6-Acetylmorphine <25 <25 ng/mL    Codeine <50 <50 ng/mL    Hydrocodone <25 <25 ng/mL     Hydromorphone <25 <25 ng/mL    Morphine  <50 <50 ng/mL    Norhydrocodone <25 <25 ng/mL    Noroxycodone <25 <25 ng/mL    Oxycodone <25 <25 ng/mL    Oxymorphone <25 <25 ng/mL    Fentanyl <2.5 <2.5 ng/mL    Norfentanyl <2.5 <2.5 ng/mL    Tramadol <50 <50 ng/mL    O-Desmethyltramadol <50 <50 ng/mL    Zolpidem <25 <25 ng/mL    Zolpidem Metabolite (ZCA) <25 <25 ng/mL   Screen Opiate/Opioid/Benzo Prescription Compliance    Collection Time: 05/31/24  2:42 PM   Result Value Ref Range    Creatinine, Urine Random 23.5 20.0 - 370.0 mg/dL    Amphetamine Screen, Urine Presumptive Negative Presumptive Negative    Barbiturate Screen, Urine Presumptive Negative Presumptive Negative    Cannabinoid Screen, Urine Presumptive Negative Presumptive Negative    Cocaine Metabolite Screen, Urine Presumptive Negative Presumptive Negative    PCP Screen, Urine Presumptive Negative Presumptive Negative     Controlled Substance Agreement: 05/24/2024  Reviewed Controlled Substance Agreement, including but not limited to:  Benefits, risks, and alternatives to treatment with a controlled substance medication(s).    Prescribed Controlled Substances:  > Benzodiazepines: Clonazepam  What is the patient's goal of therapy? Reduced anxiety and spasticity  Is this being achieved with current treatment? Yes  Activities of Daily Living:   Is your overall impression that this patient is benefiting (symptom reduction outweighs side effects) from benzodiazepine therapy? Yes   1. Physical Functioning: Same  2. Family Relationship: Same  3. Social Relationship: Same  4. Mood: Same  5. Sleep Patterns: Same  6. Overall Function: Same     MEDICATION HISTORY  Aripiprazole  Brexpiprazole - caused TD  Fluoxetine - up to 60mg/day  Paroxetine - up to 60mg/day  Ingrezza - caused Parkinsonism  Sinemet - to address Parkinsonian effects of VMAT2 inhibitor    CURRENT MEDICATIONS    Current Outpatient Medications:     cholecalciferol (Vitamin D3) 25 MCG (1000 UT) tablet, Take  1 tablet (1,000 Units) by mouth once daily., Disp: 90 tablet, Rfl: 3    clobetasol (Temovate) 0.05 % external solution, Apply 0.05 Applications topically 2 times a day., Disp: , Rfl:     clonazePAM (KlonoPIN) 0.25 mg disintegrating tablet, Take 1 tablet (0.25 mg) by mouth 2 times a day as needed for anxiety., Disp: 60 tablet, Rfl: 0    clonazePAM (KlonoPIN) 0.5 mg tablet, Take 1 tablet (0.5 mg) by mouth 3 times a day., Disp: 90 tablet, Rfl: 2    cyanocobalamin (Vitamin B-12) 250 mcg tablet, TAKE 1 TABLET BY MOUTH EVERY DAY AS DIRECTED, Disp: 90 tablet, Rfl: 3    docusate sodium (Colace) 50 mg/5 mL oral liquid, Take 5 mL (50 mg) by mouth once daily., Disp: 473 mL, Rfl: 5    famotidine (Pepcid) 20 mg tablet, TAKE 1 TABLET BY MOUTH EVERY DAY AT BEDTIME AS NEEDED, Disp: 90 tablet, Rfl: 1    hydrocortisone 2.5 % cream, Apply topically 2 times a day. to affected area, Disp: , Rfl:     ketoconazole (NIZOral) 2 % shampoo, APPLY TO SCALP AS DIRECTED., Disp: , Rfl:     levothyroxine (Synthroid, Levoxyl) 112 mcg tablet, Take 1 tablet (112 mcg) by mouth early in the morning.. Take on an empty stomach at the same time each day, either 30 to 60 minutes prior to breakfast, Disp: 90 tablet, Rfl: 1    melatonin 5 mg tablet, Take 1 tablet (5 mg) by mouth once daily at bedtime., Disp: , Rfl:     midodrine (Proamatine) 10 mg tablet, Take 1 tablet (10 mg) by mouth 3 times a day., Disp: , Rfl:     multivitamin (Daily-Ricardo, with folic acid,) tablet, Take 1 tablet by mouth once daily., Disp: 90 tablet, Rfl: 3    omeprazole (PriLOSEC) 40 mg DR capsule, Take 1 capsule (40 mg) by mouth once daily., Disp: 90 capsule, Rfl: 2    psyllium (Metamucil) powder, Take 2 teaspoons in 8 oz. Liquid daily in the morning- one month supply, Disp: , Rfl:     sodium chloride 1,000 mg tablet, Take 1 tablet (1 g) by mouth 2 times a day., Disp: , Rfl:     triamcinolone (Kenalog) 0.1 % cream, Apply 1 Film topically 2 times a day., Disp: , Rfl:     SOCIAL  HISTORY  Living situation Lives with family   Provider agency None currently   Work or day program None currently   School [employer]   Guardianship Mother (Jael)   SSA ?   Bx Specialist ?   Nicotine None   Alcohol None   Other drugs None     OBJECTIVE    Lab Results   Component Value Date    HGB 15.0 10/04/2023     10/04/2023    NEUTROABS 7.33 02/04/2023    GLUCOSE 103 (H) 10/04/2023     10/04/2023    K 3.9 10/04/2023    CO2 29 10/04/2023    CALCIUM 9.1 10/04/2023    CREATININE 0.87 10/04/2023    AST 19 10/04/2023    ALT 17 10/04/2023    AMMONIA 29 07/06/2021    TSH 0.10 (L) 08/01/2024    CHOL 181 10/04/2023    LDLF 127 (H) 01/12/2018    TRIG 135 10/04/2023    [id number] 667 06/17/2022     Therapeutic Drug Monitoring  N/A    Electrocardiograms  07/07/2021: NSR, VR 80, QTc 426    MENTAL STATUS EXAM  General/Appearance: Appropriate dress/hygiene/grooming.  Attitude/Behavior: Actively engages with interview.  Speech/Communication: Normal volume/rate/tone. Some stuttering.  Motor: Involuntary trunk and/or limb movements.   Gait: Not assessed at this visit.  Mood: Neutral.  Affect: Neutral.  Thought processes: [place].  Thought content: Future-oriented.  Perception: Does not appear to be experiencing or responding to hallucinations.  Sensorium/Cognition: Oriented to self and surroundings. Intellectual impairment.  Memory: Recent & remote recall is intact.  Insight: Minimal.  Judgment: Good. Adherent with prescribed/recommended treatment(s).    ASSESSMENT  Anxiety and OCD symptoms have been increasing over the past few months.  Discussed possible trial of buspirone as a next treatment step as this would likely be very benign and well-tolerated.  We can revisit a possible trial of fluvoxamine in the future.  Mom will contact me following this visit if she feels like she's like to pursue the trial of buspirone.    PROBLEM LIST  Intellectual developmental disorder, mild, secondary to Down  syndrome  Anxiety  OCD  Tardive dyskinesia    PLAN  -- Continue clonazepam 0.5mg TID for anxiety and dystonia   -- PRN: clonazepam 0.25mg up to twice daily  -- Follow up 3 months    Pancho Garcia MD    Prep time on date of the patient encounter: 5 minutes   Time spent directly with patient/family/caregiver: 45 minutes   Additional time spent on patient care activities: 0 minutes   Documentation time: 10 minutes   Other time spent: 0 minutes   Total time on date of patient encounter: 60 minutes

## 2024-08-20 DIAGNOSIS — F41.9 ANXIETY: ICD-10-CM

## 2024-08-20 DIAGNOSIS — F42.2 MIXED OBSESSIONAL THOUGHTS AND ACTS: ICD-10-CM

## 2024-08-20 RX ORDER — FLUVOXAMINE MALEATE 25 MG/1
25 TABLET ORAL 2 TIMES DAILY
Qty: 60 TABLET | Refills: 0 | Status: SHIPPED | OUTPATIENT
Start: 2024-08-20

## 2024-08-20 NOTE — PROGRESS NOTES
Mom reports worsening O/C behaviors since we last met:  He seems more on edge, more reactive and more obsessive. He is constantly concerned with what time it is and what time we are going to do something. He is not hungry unless it is a certain time. He only goes to the bathroom at certain times. He frequently asks us if we are mad at him with no provocation that we can understand. He is more emotional and cries more. The other night he was watching TV upstairs and we were watching a different show downstairs. He called us because he wanted us to come up and give him his scheduled drink and meds. We were nearing the end of our show and said we would be there in 10 minutes. He agreed and seemed fine. However, as we were coming upstairs he suddenly started crying and screaming and stamping his feet because we had made him wait. This is very out of character for him. He calmed down fairly quickly but it was still very disturbing.    Discussed cautiously starting trial of fluvoxamine.

## 2024-09-11 DIAGNOSIS — F41.9 ANXIETY: ICD-10-CM

## 2024-09-11 DIAGNOSIS — F42.2 MIXED OBSESSIONAL THOUGHTS AND ACTS: ICD-10-CM

## 2024-09-13 RX ORDER — FLUVOXAMINE MALEATE 25 MG/1
25 TABLET ORAL 2 TIMES DAILY
Qty: 180 TABLET | Refills: 0 | Status: SHIPPED | OUTPATIENT
Start: 2024-09-13 | End: 2024-09-13 | Stop reason: ENTERED-IN-ERROR

## 2024-09-13 NOTE — PROGRESS NOTES
Email updates from mom:  Michael Carrera, as of today Jalen has been taking Luvox - 25mg - twice daily. Unfortunately, it has had no impact on his OCD whatsoever. He also continues to be extremely negative at times and cries easily. Even worse, we think it is exacerbating his dystonia, although we are not completely sure it is from the Luvox.  He tends to arch his back and lean far backwards and his head is often pulled to his right shoulder. Both of these are much worse and we have to be extremely careful and make sure he doesn't fall.  We think we need to discontinue the Luvox in whatever fashion is safe in order to determine if it is the cause of his worsened dystonia. After whatever time period is necessary to determine that, we can figure out how to proceed. Perhaps he needs a higher dose of Luvox or maybe a different drug? I wish I had better news to report. This is disheartening.     It could still be that the Luvox dose is too low to be helping with the mood and OCD, but we do need to determine if it's contributing to the worsening dystonia.      I'd decrease to 25mg once daily for 7 days, then stop.  We probably want him completely off the Luvox for at least 2 weeks to see how his dystonia looks.

## 2024-10-03 ENCOUNTER — APPOINTMENT (OUTPATIENT)
Dept: PRIMARY CARE | Facility: CLINIC | Age: 49
End: 2024-10-03
Payer: MEDICARE

## 2024-10-03 VITALS
DIASTOLIC BLOOD PRESSURE: 70 MMHG | WEIGHT: 124 LBS | HEIGHT: 62 IN | OXYGEN SATURATION: 99 % | BODY MASS INDEX: 22.82 KG/M2 | SYSTOLIC BLOOD PRESSURE: 108 MMHG | HEART RATE: 88 BPM

## 2024-10-03 DIAGNOSIS — Z00.00 ROUTINE GENERAL MEDICAL EXAMINATION AT HEALTH CARE FACILITY: Primary | ICD-10-CM

## 2024-10-03 DIAGNOSIS — G21.9 SECONDARY PARKINSONISM, UNSPECIFIED SECONDARY PARKINSONISM TYPE (MULTI): ICD-10-CM

## 2024-10-03 DIAGNOSIS — Z12.5 SCREENING FOR PROSTATE CANCER: ICD-10-CM

## 2024-10-03 DIAGNOSIS — E03.9 HYPOTHYROIDISM, UNSPECIFIED TYPE: ICD-10-CM

## 2024-10-03 DIAGNOSIS — G24.01 TARDIVE DYSTONIA: ICD-10-CM

## 2024-10-03 DIAGNOSIS — E78.5 HYPERLIPIDEMIA, UNSPECIFIED HYPERLIPIDEMIA TYPE: ICD-10-CM

## 2024-10-03 DIAGNOSIS — Z23 NEED FOR INFLUENZA VACCINATION: ICD-10-CM

## 2024-10-03 PROCEDURE — G0439 PPPS, SUBSEQ VISIT: HCPCS | Performed by: INTERNAL MEDICINE

## 2024-10-03 PROCEDURE — 1036F TOBACCO NON-USER: CPT | Performed by: INTERNAL MEDICINE

## 2024-10-03 PROCEDURE — 90656 IIV3 VACC NO PRSV 0.5 ML IM: CPT | Performed by: INTERNAL MEDICINE

## 2024-10-03 PROCEDURE — 3008F BODY MASS INDEX DOCD: CPT | Performed by: INTERNAL MEDICINE

## 2024-10-03 PROCEDURE — 99214 OFFICE O/P EST MOD 30 MIN: CPT | Performed by: INTERNAL MEDICINE

## 2024-10-03 PROCEDURE — G0008 ADMIN INFLUENZA VIRUS VAC: HCPCS | Performed by: INTERNAL MEDICINE

## 2024-10-03 ASSESSMENT — ACTIVITIES OF DAILY LIVING (ADL)
TAKING_MEDICATION: TOTAL CARE
MANAGING_FINANCES: TOTAL CARE
BATHING: NEEDS ASSISTANCE
DRESSING: NEEDS ASSISTANCE
GROCERY_SHOPPING: TOTAL CARE
DOING_HOUSEWORK: TOTAL CARE

## 2024-10-03 ASSESSMENT — PATIENT HEALTH QUESTIONNAIRE - PHQ9
1. LITTLE INTEREST OR PLEASURE IN DOING THINGS: NOT AT ALL
2. FEELING DOWN, DEPRESSED OR HOPELESS: NOT AT ALL
SUM OF ALL RESPONSES TO PHQ9 QUESTIONS 1 AND 2: 0

## 2024-10-03 NOTE — PATIENT INSTRUCTIONS
Physical Therapy main number [phone]  [facility] ([facility]) [phone]    Please complete fasting blood work. Fast for 10 hours, black coffee and water the morning of labs are OK.     6 months

## 2024-10-03 NOTE — PROGRESS NOTES
"Subjective   Patient ID: Jalen Blackwell is a 49 y.o. male who presents for Medicare Annual Wellness Visit Subsequent.    HPI     Anxiety and OCD have been worse. Following with psych  PEG has been removed Tolerating PO intake OK   No other new issues     Review of Systems   All other systems reviewed and are negative.      Objective   /70   Pulse 88   Ht 1.575 m (5' 2\")   Wt 56.2 kg (124 lb)   SpO2 99%   BMI 22.68 kg/m²     Physical Exam  Constitutional:       Comments: Developmental delay   Pulmonary:      Effort: Pulmonary effort is normal.   Musculoskeletal:      Comments: Using Rolator   Neurological:      Mental Status: He is alert.   Psychiatric:         Mood and Affect: Mood normal.         Behavior: Behavior normal.         Assessment/Plan       #Anxiety -worse  -Following with psych, continues to take clonazepam.      #Aspiration pneumonia   -now s/p PEG. Cont SLP - diet regimen per their recs   -PEG has been removed     #HLD  -Check FBW     #Chronic hypotension   -Following with Dr. Baker . Cont Midodrine 10mg TID     #GERD  -Sx stable. Recent EGD 7/16/21 with reflux esophagitis (no Barretts on path). Cont PPI daily, famotidine qhs and Gaviscon.     #Tardive dyskinesia, drug induced parkinsonism   -Following with neurology at [facility]     #hypothyroidism   -Cont levothyroxine, check TSH      #Constipation   -Cont docusate 100mg BID liquid      Influenza: 10/3/24  COVID: x2   Prevnar 13: Never  Pneumovax 23: Never  Shingrix: Never   CRC: [id number]/13/23 neg   PSA: 10/4/23--family hx of prostate ca --order today   Lung ca screening: NI   AAA: NI         RTC 6 mo        "

## 2024-10-03 NOTE — ASSESSMENT & PLAN NOTE
Stable based on symptoms and exam. Follow established treatment plan. Follow up at least annually.    
Strong peripheral pulses/Capillary refill less/equal to 2 seconds

## 2024-10-12 DIAGNOSIS — F42.2 MIXED OBSESSIONAL THOUGHTS AND ACTS: Primary | ICD-10-CM

## 2024-10-12 DIAGNOSIS — F41.9 ANXIETY: ICD-10-CM

## 2024-10-12 DIAGNOSIS — Q90.9 DOWN SYNDROME: ICD-10-CM

## 2024-10-12 RX ORDER — CLONAZEPAM 1 MG/1
1 TABLET ORAL 3 TIMES DAILY
Qty: 90 TABLET | Refills: 0 | Status: SHIPPED | OUTPATIENT
Start: 2024-10-12
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
## 2024-10-16 ENCOUNTER — LAB (OUTPATIENT)
Dept: LAB | Facility: LAB | Age: 49
End: 2024-10-16
Payer: MEDICAID

## 2024-10-16 DIAGNOSIS — E78.5 HYPERLIPIDEMIA, UNSPECIFIED HYPERLIPIDEMIA TYPE: ICD-10-CM

## 2024-10-16 DIAGNOSIS — Z12.5 SCREENING FOR PROSTATE CANCER: ICD-10-CM

## 2024-10-16 DIAGNOSIS — E03.9 HYPOTHYROIDISM, UNSPECIFIED TYPE: ICD-10-CM

## 2024-10-16 LAB
ALBUMIN SERPL BCP-MCNC: 3.9 G/DL (ref 3.4–5)
ALP SERPL-CCNC: 70 U/L (ref 33–120)
ALT SERPL W P-5'-P-CCNC: 13 U/L (ref 10–52)
ANION GAP SERPL CALC-SCNC: 12 MMOL/L (ref 10–20)
AST SERPL W P-5'-P-CCNC: 20 U/L (ref 9–39)
BILIRUB SERPL-MCNC: 0.7 MG/DL (ref 0–1.2)
BUN SERPL-MCNC: 19 MG/DL (ref 6–23)
CALCIUM SERPL-MCNC: 9.1 MG/DL (ref 8.6–10.6)
CHLORIDE SERPL-SCNC: 101 MMOL/L (ref 98–107)
CHOLEST SERPL-MCNC: 238 MG/DL (ref 0–199)
CHOLESTEROL/HDL RATIO: 5.2
CO2 SERPL-SCNC: 31 MMOL/L (ref 21–32)
CREAT SERPL-MCNC: 0.87 MG/DL (ref 0.5–1.3)
EGFRCR SERPLBLD CKD-EPI 2021: >90 ML/MIN/1.73M*2
ERYTHROCYTE [DISTWIDTH] IN BLOOD BY AUTOMATED COUNT: 12.5 % (ref 11.5–14.5)
GLUCOSE SERPL-MCNC: 93 MG/DL (ref 74–99)
HCT VFR BLD AUTO: 43.3 % (ref 41–52)
HDLC SERPL-MCNC: 45.9 MG/DL
HGB BLD-MCNC: 15.1 G/DL (ref 13.5–17.5)
LDLC SERPL CALC-MCNC: 165 MG/DL
MCH RBC QN AUTO: 33.6 PG (ref 26–34)
MCHC RBC AUTO-ENTMCNC: 34.9 G/DL (ref 32–36)
MCV RBC AUTO: 96 FL (ref 80–100)
NON HDL CHOLESTEROL: 192 MG/DL (ref 0–149)
NRBC BLD-RTO: 0 /100 WBCS (ref 0–0)
PLATELET # BLD AUTO: 183 X10*3/UL (ref 150–450)
POTASSIUM SERPL-SCNC: 4 MMOL/L (ref 3.5–5.3)
PROT SERPL-MCNC: 7.1 G/DL (ref 6.4–8.2)
PSA SERPL-MCNC: 0.3 NG/ML
RBC # BLD AUTO: 4.5 X10*6/UL (ref 4.5–5.9)
SODIUM SERPL-SCNC: 140 MMOL/L (ref 136–145)
TRIGL SERPL-MCNC: 134 MG/DL (ref 0–149)
TSH SERPL-ACNC: 0.63 MIU/L (ref 0.44–3.98)
VLDL: 27 MG/DL (ref 0–40)
WBC # BLD AUTO: 5 X10*3/UL (ref 4.4–11.3)

## 2024-10-16 PROCEDURE — 84443 ASSAY THYROID STIM HORMONE: CPT

## 2024-10-16 PROCEDURE — 85027 COMPLETE CBC AUTOMATED: CPT

## 2024-10-16 PROCEDURE — 80061 LIPID PANEL: CPT

## 2024-10-16 PROCEDURE — G0103 PSA SCREENING: HCPCS

## 2024-10-16 PROCEDURE — 80053 COMPREHEN METABOLIC PANEL: CPT

## 2024-10-16 PROCEDURE — 36415 COLL VENOUS BLD VENIPUNCTURE: CPT

## 2024-10-19 DIAGNOSIS — E78.5 HYPERLIPIDEMIA, UNSPECIFIED HYPERLIPIDEMIA TYPE: Primary | ICD-10-CM

## 2024-10-26 DIAGNOSIS — F33.2 SEVERE EPISODE OF RECURRENT MAJOR DEPRESSIVE DISORDER, WITHOUT PSYCHOTIC FEATURES (MULTI): ICD-10-CM

## 2024-10-26 NOTE — PROGRESS NOTES
"See updates from mom below:    The increased dose of Clonazepam is helping a bit but it's simply nowhere near enough. I feel that Jalen's overall mental state and his happiness has significantly declined to the point where it's harmful to him and harmful to us.     He has a pervasive negativity about pretty much everything and has stopped calling his friends and refused to go to a [employer] dance he has attended every year for years. He also refuses to do anything anywhere that could possibly be construed as \"fun.\" He has become defiant about many things and seems to think he is in charge of decisions such as where we are going or whether or not he is returning to physical therapy.     We did insist he return to physical therapy but he was barely cooperative, negative and rude. At the same time he asked us over and over if he did a good job. (Of course we tell him yes).     There is an air of desperation about him and I don't think he can control his negativity. I just think he's really depressed. But he's backing himself into a corner where he's making things worse for himself. He's becoming more isolated and his physical health is declining.     We can't get him to do any kind of physical activity at home, he won't even let me stretch his neck, and his cholesterol has shot up and his neck and muscle spasms are the worst I've ever seen.     ASSESSMENT     NTG-EDSD showed 8 \"new\" problems in the Behavior/Affect category, which could be attributable to early cognitive decline, but could equally be explained by ongoing depression/anxiety/OCD.  We decided to re-try an antidepressant that may be much less likely to cause dystonia.     PROBLEM LIST  Intellectual developmental disorder, mild, secondary to Down syndrome  MDD, recurrent, severe, w/o psychotic features  Anxiety  OCD  Tardive dyskinesia     PLAN  -- Start Trintellix 10mg daily  -- Continue clonazepam 1mg TID as tolerated for anxiety, OCD, and dystonia   -- " Follow up scheduled for 11/07/24

## 2024-10-30 DIAGNOSIS — E78.5 HYPERLIPIDEMIA, UNSPECIFIED HYPERLIPIDEMIA TYPE: Primary | ICD-10-CM

## 2024-10-30 RX ORDER — ROSUVASTATIN CALCIUM 5 MG/1
5 TABLET, COATED ORAL DAILY
Qty: 90 TABLET | Refills: 1 | Status: SHIPPED | OUTPATIENT
Start: 2024-10-30 | End: 2025-12-04

## 2024-11-05 DIAGNOSIS — K21.9 GASTROESOPHAGEAL REFLUX DISEASE WITHOUT ESOPHAGITIS: ICD-10-CM

## 2024-11-06 RX ORDER — OMEPRAZOLE 40 MG/1
40 CAPSULE, DELAYED RELEASE ORAL DAILY
Qty: 90 CAPSULE | Refills: 2 | Status: SHIPPED | OUTPATIENT
Start: 2024-11-06

## 2024-11-07 ENCOUNTER — APPOINTMENT (OUTPATIENT)
Dept: BEHAVIORAL HEALTH | Facility: CLINIC | Age: 49
End: 2024-11-07
Payer: MEDICARE

## 2024-11-07 DIAGNOSIS — G24.01 TARDIVE DYSTONIA: ICD-10-CM

## 2024-11-07 DIAGNOSIS — Q90.9 DOWN SYNDROME: ICD-10-CM

## 2024-11-07 DIAGNOSIS — F42.2 MIXED OBSESSIONAL THOUGHTS AND ACTS: ICD-10-CM

## 2024-11-07 DIAGNOSIS — F33.2 SEVERE EPISODE OF RECURRENT MAJOR DEPRESSIVE DISORDER, WITHOUT PSYCHOTIC FEATURES (MULTI): Primary | ICD-10-CM

## 2024-11-07 DIAGNOSIS — G24.01 TARDIVE DYSKINESIA: ICD-10-CM

## 2024-11-07 DIAGNOSIS — F41.9 ANXIETY: ICD-10-CM

## 2024-11-07 DIAGNOSIS — F79 INTELLECTUAL DISABILITY: ICD-10-CM

## 2024-11-07 PROCEDURE — G2211 COMPLEX E/M VISIT ADD ON: HCPCS | Performed by: PSYCHIATRY & NEUROLOGY

## 2024-11-07 PROCEDURE — 99215 OFFICE O/P EST HI 40 MIN: CPT | Performed by: PSYCHIATRY & NEUROLOGY

## 2024-11-07 PROCEDURE — 1036F TOBACCO NON-USER: CPT | Performed by: PSYCHIATRY & NEUROLOGY

## 2024-11-07 ASSESSMENT — ENCOUNTER SYMPTOMS
EYE PAIN: 0
NERVOUS/ANXIOUS: 1
CHOKING: 0
WEAKNESS: 1
TROUBLE SWALLOWING: 0
ABDOMINAL PAIN: 0
DIARRHEA: 0
VOMITING: 0
DYSPHORIC MOOD: 1
SEIZURES: 0
NECK STIFFNESS: 1
ACTIVITY CHANGE: 0
CONSTIPATION: 0
SLEEP DISTURBANCE: 0

## 2024-11-07 NOTE — PROGRESS NOTES
Outpatient Adult IDD Psychiatry    A HIPAA-compliant interactive audio and video telecommunication system which permits real time communications between the patient (at the originating site) and provider (at the distant site) was utilized to provide this telehealth service.     The patient, family, caregivers and guardian (as appropriate) have provided consent to conduct treatment via this telehealth service.      The patient's identity and physical location were verified at the time of this visit.     Present for appointment: Jalen and mom/guardian (Jael).    Appointment location: Home.  [address] [facility] [address]     SUBJECTIVE    See telephone notes from 10/12 and 10/26 for interval updates since last appointment.  Now on vortioxetine 10mg daily for about 12 days with some slight improvements in mood.  Somewhat less obsessive/rigid.  Somewhat more affectionate, responsive, and engaged in interactions with mom and step-dad.  Resumed attending PT.  Will see ophthalmology on Monday of next week for assessment of risk for developing glaucoma, which is listed as a potential AE from the vortioxetine.  Weight/appetite are stable.  No recurrence of dysphagia.    Review of Systems   Constitutional:  Negative for activity change.   HENT:  Negative for trouble swallowing.    Eyes:  Negative for pain and visual disturbance.   Respiratory:  Negative for choking.    Gastrointestinal:  Negative for abdominal pain, constipation, diarrhea and vomiting.   Musculoskeletal:  Positive for gait problem and neck stiffness.   Neurological:  Positive for weakness. Negative for seizures and syncope.   Psychiatric/Behavioral:  Positive for behavioral problems and dysphoric mood. Negative for sleep disturbance. The patient is nervous/anxious.       Controlled Substance Evaluation  OARRS/PDMP reviewed: Pancho Garica MD on 11/7/2024  7:17 PM  Is the patient prescribed a combination of a benzodiazepine and  opioid? No  I have personally reviewed the OARRS report for Jalen Blackwell.   I have considered the risks of abuse, dependence, addiction and diversion.    I believe that it is clinically appropriate for Jalen Blackwell to be prescribed this medication.    Last Urine Drug Screen:  Recent Results (from the past 8760 hours)   Confirmation Opiate/Opioid/Benzo Prescription Compliance    Collection Time: 05/31/24  2:42 PM   Result Value Ref Range    Clonazepam <25 <25 ng/mL    7-Aminoclonazepam 159 (H) <25 ng/mL    Alprazolam <25 <25 ng/mL    Alpha-Hydroxyalprazolam <25 <25 ng/mL    Midazolam <25 <25 ng/mL    Alpha-Hydroxymidazolam <25 <25 ng/mL    Chlordiazepoxide <25 <25 ng/mL    Diazepam <25 <25 ng/mL    Nordiazepam <25 <25 ng/mL    Temazepam <25 <25 ng/mL    Oxazepam <25 <25 ng/mL    Lorazepam <25 <25 ng/mL    Methadone <25 <25 ng/mL    EDDP <25 <25 ng/mL    6-Acetylmorphine <25 <25 ng/mL    Codeine <50 <50 ng/mL    Hydrocodone <25 <25 ng/mL    Hydromorphone <25 <25 ng/mL    Morphine  <50 <50 ng/mL    Norhydrocodone <25 <25 ng/mL    Noroxycodone <25 <25 ng/mL    Oxycodone <25 <25 ng/mL    Oxymorphone <25 <25 ng/mL    Fentanyl <2.5 <2.5 ng/mL    Norfentanyl <2.5 <2.5 ng/mL    Tramadol <50 <50 ng/mL    O-Desmethyltramadol <50 <50 ng/mL    Zolpidem <25 <25 ng/mL    Zolpidem Metabolite (ZCA) <25 <25 ng/mL   Screen Opiate/Opioid/Benzo Prescription Compliance    Collection Time: 05/31/24  2:42 PM   Result Value Ref Range    Creatinine, Urine Random 23.5 20.0 - 370.0 mg/dL    Amphetamine Screen, Urine Presumptive Negative Presumptive Negative    Barbiturate Screen, Urine Presumptive Negative Presumptive Negative    Cannabinoid Screen, Urine Presumptive Negative Presumptive Negative    Cocaine Metabolite Screen, Urine Presumptive Negative Presumptive Negative    PCP Screen, Urine Presumptive Negative Presumptive Negative     Controlled Substance Agreement: 05/24/2024  Reviewed Controlled Substance Agreement, including but  not limited to:  Benefits, risks, and alternatives to treatment with a controlled substance medication(s).    Prescribed Controlled Substances:  > Benzodiazepines: Clonazepam  What is the patient's goal of therapy? Reduced anxiety and spasticity  Is this being achieved with current treatment? Yes  Activities of Daily Living:   Is your overall impression that this patient is benefiting (symptom reduction outweighs side effects) from benzodiazepine therapy? Yes   1. Physical Functioning: Same  2. Family Relationship: Same  3. Social Relationship: Same  4. Mood: Same  5. Sleep Patterns: Same  6. Overall Function: Same     MEDICATION HISTORY  Aripiprazole  Brexpiprazole - caused TD  Fluoxetine - up to 60mg/day  Paroxetine - up to 60mg/day  Ingrezza - caused Parkinsonism  Sinemet - to address Parkinsonian effects of VMAT2 inhibitor    CURRENT MEDICATIONS    Current Outpatient Medications:     clobetasol (Temovate) 0.05 % external solution, Apply 0.05 Applications topically 2 times a day., Disp: , Rfl:     clonazePAM (KlonoPIN) 0.25 mg disintegrating tablet, Take 1 tablet (0.25 mg) by mouth 2 times a day as needed for anxiety., Disp: 60 tablet, Rfl: 0    clonazePAM (KlonoPIN) 1 mg tablet, Take 1 tablet (1 mg) by mouth 3 times a day., Disp: 90 tablet, Rfl: 0    cyanocobalamin (Vitamin B-12) 250 mcg tablet, TAKE 1 TABLET BY MOUTH EVERY DAY AS DIRECTED, Disp: 90 tablet, Rfl: 3    docusate sodium (Colace) 50 mg/5 mL oral liquid, Take 5 mL (50 mg) by mouth once daily., Disp: 473 mL, Rfl: 5    famotidine (Pepcid) 20 mg tablet, TAKE 1 TABLET BY MOUTH EVERY DAY AT BEDTIME AS NEEDED, Disp: 90 tablet, Rfl: 1    hydrocortisone 2.5 % cream, Apply topically 2 times a day. to affected area, Disp: , Rfl:     ketoconazole (NIZOral) 2 % shampoo, APPLY TO SCALP AS DIRECTED., Disp: , Rfl:     levothyroxine (Synthroid, Levoxyl) 112 mcg tablet, Take 1 tablet (112 mcg) by mouth early in the morning.. Take on an empty stomach at the same time  each day, either 30 to 60 minutes prior to breakfast, Disp: 90 tablet, Rfl: 1    melatonin 5 mg tablet, Take 1 tablet (5 mg) by mouth once daily at bedtime., Disp: , Rfl:     midodrine (Proamatine) 10 mg tablet, Take 1 tablet (10 mg) by mouth 3 times a day., Disp: , Rfl:     multivitamin (Daily-Ricardo, with folic acid,) tablet, Take 1 tablet by mouth once daily., Disp: 90 tablet, Rfl: 3    omeprazole (PriLOSEC) 40 mg DR capsule, TAKE 1 CAPSULE BY MOUTH ONCE DAILY, Disp: 90 capsule, Rfl: 2    psyllium (Metamucil) powder, Take 2 teaspoons in 8 oz. Liquid daily in the morning- one month supply, Disp: , Rfl:     rosuvastatin (Crestor) 5 mg tablet, Take 1 tablet (5 mg) by mouth once daily., Disp: 90 tablet, Rfl: 1    sodium chloride 1,000 mg tablet, Take 1 tablet (1 g) by mouth 2 times a day., Disp: , Rfl:     triamcinolone (Kenalog) 0.1 % cream, Apply 1 Film topically 2 times a day., Disp: , Rfl:     Vitamin D3 25 mcg (1,000 unit) tablet, TAKE 1 TABLET BY MOUTH EVERY DAY, Disp: 90 tablet, Rfl: 2    vortioxetine (Trintellix) 10 mg tablet tablet, Take 1 tablet (10 mg) by mouth once daily., Disp: 30 tablet, Rfl: 0    SOCIAL HISTORY  Living situation Lives with family   Provider agency None currently   Work or day program None currently   School [employer]   Guardianship Mother (Jael)   SSA ?   Bx Specialist ?   Nicotine None   Alcohol None   Other drugs None     OBJECTIVE    Lab Results   Component Value Date    HGB 15.1 10/16/2024     10/16/2024    NEUTROABS 7.33 02/04/2023    GLUCOSE 93 10/16/2024     10/16/2024    K 4.0 10/16/2024    CO2 31 10/16/2024    CALCIUM 9.1 10/16/2024    CREATININE 0.87 10/16/2024    AST 20 10/16/2024    ALT 13 10/16/2024    AMMONIA 29 07/06/2021    TSH 0.63 10/16/2024    CHOL 238 (H) 10/16/2024    LDLF 127 (H) 01/12/2018    TRIG 134 10/16/2024    LSOALJDL45 667 06/17/2022     Therapeutic Drug Monitoring  N/A    Electrocardiograms  07/07/2021: NSR, VR 80, QTc 426    MENTAL  STATUS EXAM  General/Appearance: Appropriate dress/hygiene/grooming.  Attitude/Behavior: Superficially cooperative.  Speech/Communication: Normal volume/rate/tone. Some stuttering.  Motor:  Dystonia.   Gait: Not assessed at this visit.  Mood: Neutral.  Affect: Neutral.  Thought processes: [place].  Thought content: Future-oriented.  Perception: Does not appear to be experiencing or responding to hallucinations.  Sensorium/Cognition: Oriented to self and surroundings. Intellectual impairment.  Memory: Recent & remote recall is intact.  Insight: Minimal.  Judgment: Fair.    ASSESSMENT  Some initial response since starting vortioxetine -- will titrate as tolerated.    PROBLEM LIST  Intellectual developmental disorder, mild, secondary to Down syndrome  Anxiety  OCD  Tardive dyskinesia    PLAN  -- Increase vortioxetine to 15mg daily after four weeks for depression/anxiety  -- Continue clonazepam 1mg - 1mg - 0.5mg for anxiety and dystonia   -- PRN: clonazepam 0.25mg up to twice daily  -- Follow up 6 weeks    Pancho Garcia MD    Prep time on date of the patient encounter: 5 minutes   Time spent directly with patient/family/caregiver: 45 minutes   Additional time spent on patient care activities: 0 minutes   Documentation time: 5 minutes   Other time spent: 0 minutes   Total time on date of patient encounter: 55 minutes

## 2024-11-20 DIAGNOSIS — F42.2 MIXED OBSESSIONAL THOUGHTS AND ACTS: ICD-10-CM

## 2024-11-20 DIAGNOSIS — F41.9 ANXIETY: ICD-10-CM

## 2024-11-20 RX ORDER — CLONAZEPAM 1 MG/1
1 TABLET ORAL 3 TIMES DAILY
Qty: 90 TABLET | Refills: 1 | Status: SHIPPED | OUTPATIENT
Start: 2024-11-20

## 2024-12-18 ENCOUNTER — APPOINTMENT (OUTPATIENT)
Dept: BEHAVIORAL HEALTH | Facility: CLINIC | Age: 49
End: 2024-12-18
Payer: MEDICARE

## 2024-12-18 DIAGNOSIS — F42.2 MIXED OBSESSIONAL THOUGHTS AND ACTS: ICD-10-CM

## 2024-12-18 DIAGNOSIS — F41.9 ANXIETY: Primary | ICD-10-CM

## 2024-12-18 DIAGNOSIS — G24.01 TARDIVE DYSKINESIA: ICD-10-CM

## 2024-12-18 DIAGNOSIS — G24.01 TARDIVE DYSTONIA: ICD-10-CM

## 2024-12-18 DIAGNOSIS — F79 INTELLECTUAL DISABILITY: ICD-10-CM

## 2024-12-18 DIAGNOSIS — Q90.9 DOWN SYNDROME: ICD-10-CM

## 2024-12-18 PROCEDURE — 99215 OFFICE O/P EST HI 40 MIN: CPT | Performed by: PSYCHIATRY & NEUROLOGY

## 2024-12-18 PROCEDURE — 1036F TOBACCO NON-USER: CPT | Performed by: PSYCHIATRY & NEUROLOGY

## 2024-12-18 PROCEDURE — G2211 COMPLEX E/M VISIT ADD ON: HCPCS | Performed by: PSYCHIATRY & NEUROLOGY

## 2024-12-18 ASSESSMENT — ENCOUNTER SYMPTOMS
ABDOMINAL PAIN: 0
EYE PAIN: 0
CONSTIPATION: 0
WEAKNESS: 1
VOMITING: 0
NERVOUS/ANXIOUS: 1
TROUBLE SWALLOWING: 0
ACTIVITY CHANGE: 0
DIARRHEA: 0
SEIZURES: 0
SLEEP DISTURBANCE: 0
DYSPHORIC MOOD: 1
NECK STIFFNESS: 1
CHOKING: 0

## 2024-12-18 NOTE — PROGRESS NOTES
"Outpatient Adult IDD Psychiatry    A HIPAA-compliant interactive audio and video telecommunication system which permits real time communications between the patient (at the originating site) and provider (at the distant site) was utilized to provide this telehealth service.     The patient, family, caregivers and guardian (as appropriate) have provided consent to conduct treatment via this telehealth service.      The patient's identity and physical location were verified at the time of this visit.     Present for appointment: Jalen and mom/guardian (Jael).    Appointment location: Home.  [address] [place] [facility] [address]     SUBJECTIVE    Trintellix was increased from 10mg/day to 15mg/day shortly after our last visit 6 weeks ago.      Mom reports that mood is showing some gradual signs of improvement (seems happier, less withdrawn, more smiling/laughing and chatty, able to be affectionate and engage in some playful teasing).  There was one episode where dad got very anxious that Jalen was trying to walk through the house w/o his walker or any assistance and he got emotionally heightened.  Jalen seemed to interpret this as dad being \"angry\" at him and later in the day was sobbing uncontrollably over it.  At times, mom describes him as being very sensitive and \"fragile.\"    At times he can still be incredibly defiant, and often will put up resistance to going to PT.  Mom has found that being firm and unwavering, but emotionally neutral about the topic, has worked to get him to attend.  He is given ample praise and positive feedback after he successfully attends a PT session.    There is still a great deal of anxiety, with Jalen frequently asking questions and needing reassurance about what is happening and when.  He remains unreasonably rigid in some of his routines (eg, only eating or toileting at certain specific times of day).    He will repeatedly bring up the same topics at home (eg, who he will " "be talking to on the phone later that week).  At times he will conveniently say he \"forgot\" or \"misunderstood\" information, particularly if it is pertaining to something he really doesn't want to do, like PT.  However, for other events that he is interested in (eg, the family's evening TV routine), he is able to organize, plan, and remember all of these details without problems.    Parents are currently giving clonazepam as follows: 0.5mg in the morning and at bedtime, and 1mg around 14:30.  They have not needed to use extra as-needed doses of clonazepam over the past 6 weeks.    Review of Systems   Constitutional:  Negative for activity change.   HENT:  Negative for trouble swallowing.    Eyes:  Negative for pain and visual disturbance.   Respiratory:  Negative for choking.    Gastrointestinal:  Negative for abdominal pain, constipation, diarrhea and vomiting.   Musculoskeletal:  Positive for gait problem and neck stiffness.   Neurological:  Positive for weakness. Negative for seizures and syncope.   Psychiatric/Behavioral:  Positive for behavioral problems and dysphoric mood. Negative for sleep disturbance. The patient is nervous/anxious.       Controlled Substance Evaluation  OARRS/PDMP reviewed: Pancho Garcia MD on 12/18/2024  6:59 AM  Is the patient prescribed a combination of a benzodiazepine and opioid? No  I have personally reviewed the OARRS report for Jalen Blackwell.   I have considered the risks of abuse, dependence, addiction and diversion.    I believe that it is clinically appropriate for Jalen Blackwell to be prescribed this medication.    Last Urine Drug Screen:  Recent Results (from the past 8760 hours)   Confirmation Opiate/Opioid/Benzo Prescription Compliance    Collection Time: 05/31/24  2:42 PM   Result Value Ref Range    Clonazepam <25 <25 ng/mL    7-Aminoclonazepam 159 (H) <25 ng/mL    Alprazolam <25 <25 ng/mL    Alpha-Hydroxyalprazolam <25 <25 ng/mL    Midazolam <25 <25 ng/mL    " Alpha-Hydroxymidazolam <25 <25 ng/mL    Chlordiazepoxide <25 <25 ng/mL    Diazepam <25 <25 ng/mL    Nordiazepam <25 <25 ng/mL    Temazepam <25 <25 ng/mL    Oxazepam <25 <25 ng/mL    Lorazepam <25 <25 ng/mL    Methadone <25 <25 ng/mL    EDDP <25 <25 ng/mL    6-Acetylmorphine <25 <25 ng/mL    Codeine <50 <50 ng/mL    Hydrocodone <25 <25 ng/mL    Hydromorphone <25 <25 ng/mL    Morphine  <50 <50 ng/mL    Norhydrocodone <25 <25 ng/mL    Noroxycodone <25 <25 ng/mL    Oxycodone <25 <25 ng/mL    Oxymorphone <25 <25 ng/mL    Fentanyl <2.5 <2.5 ng/mL    Norfentanyl <2.5 <2.5 ng/mL    Tramadol <50 <50 ng/mL    O-Desmethyltramadol <50 <50 ng/mL    Zolpidem <25 <25 ng/mL    Zolpidem Metabolite (ZCA) <25 <25 ng/mL   Screen Opiate/Opioid/Benzo Prescription Compliance    Collection Time: 05/31/24  2:42 PM   Result Value Ref Range    Creatinine, Urine Random 23.5 20.0 - 370.0 mg/dL    Amphetamine Screen, Urine Presumptive Negative Presumptive Negative    Barbiturate Screen, Urine Presumptive Negative Presumptive Negative    Cannabinoid Screen, Urine Presumptive Negative Presumptive Negative    Cocaine Metabolite Screen, Urine Presumptive Negative Presumptive Negative    PCP Screen, Urine Presumptive Negative Presumptive Negative     Controlled Substance Agreement: 05/24/2024  Reviewed Controlled Substance Agreement, including but not limited to:  Benefits, risks, and alternatives to treatment with a controlled substance medication(s).    Prescribed Controlled Substances:  > Benzodiazepines: Clonazepam  What is the patient's goal of therapy? Reduced anxiety and spasticity  Is this being achieved with current treatment? Yes  Activities of Daily Living:   Is your overall impression that this patient is benefiting (symptom reduction outweighs side effects) from benzodiazepine therapy? Yes   1. Physical Functioning: Same  2. Family Relationship: Same  3. Social Relationship: Same  4. Mood: Same  5. Sleep Patterns: Same  6. Overall  Function: Same     MEDICATION HISTORY  Aripiprazole  Brexpiprazole - caused TD  Fluoxetine - up to 60mg/day  Paroxetine - up to 60mg/day  Ingrezza - caused Parkinsonism  Sinemet - to address Parkinsonian effects of VMAT2 inhibitor    CURRENT MEDICATIONS    Current Outpatient Medications:     clobetasol (Temovate) 0.05 % external solution, Apply 0.05 Applications topically 2 times a day., Disp: , Rfl:     clonazePAM (KlonoPIN) 1 mg tablet, Take 1 tablet (1 mg) by mouth 3 times a day., Disp: 90 tablet, Rfl: 1    cyanocobalamin (Vitamin B-12) 250 mcg tablet, TAKE 1 TABLET BY MOUTH EVERY DAY AS DIRECTED, Disp: 90 tablet, Rfl: 3    docusate sodium (Colace) 50 mg/5 mL oral liquid, Take 5 mL (50 mg) by mouth once daily., Disp: 473 mL, Rfl: 5    famotidine (Pepcid) 20 mg tablet, TAKE 1 TABLET BY MOUTH EVERY DAY AT BEDTIME AS NEEDED, Disp: 90 tablet, Rfl: 1    hydrocortisone 2.5 % cream, Apply topically 2 times a day. to affected area, Disp: , Rfl:     ketoconazole (NIZOral) 2 % shampoo, APPLY TO SCALP AS DIRECTED., Disp: , Rfl:     levothyroxine (Synthroid, Levoxyl) 112 mcg tablet, Take 1 tablet (112 mcg) by mouth early in the morning.. Take on an empty stomach at the same time each day, either 30 to 60 minutes prior to breakfast, Disp: 90 tablet, Rfl: 1    melatonin 5 mg tablet, Take 1 tablet (5 mg) by mouth once daily at bedtime., Disp: , Rfl:     midodrine (Proamatine) 10 mg tablet, Take 1 tablet (10 mg) by mouth 3 times a day., Disp: , Rfl:     multivitamin (Daily-Ricardo, with folic acid,) tablet, Take 1 tablet by mouth once daily., Disp: 90 tablet, Rfl: 3    omeprazole (PriLOSEC) 40 mg DR capsule, TAKE 1 CAPSULE BY MOUTH ONCE DAILY, Disp: 90 capsule, Rfl: 2    psyllium (Metamucil) powder, Take 2 teaspoons in 8 oz. Liquid daily in the morning- one month supply, Disp: , Rfl:     rosuvastatin (Crestor) 5 mg tablet, Take 1 tablet (5 mg) by mouth once daily., Disp: 90 tablet, Rfl: 1    sodium chloride 1,000 mg tablet, Take 1  tablet (1 g) by mouth 2 times a day., Disp: , Rfl:     triamcinolone (Kenalog) 0.1 % cream, Apply 1 Film topically 2 times a day., Disp: , Rfl:     Vitamin D3 25 mcg (1,000 unit) tablet, TAKE 1 TABLET BY MOUTH EVERY DAY, Disp: 90 tablet, Rfl: 2    vortioxetine (Trintellix) 10 mg tablet tablet, Take 1 tablet (10 mg) by mouth once daily., Disp: 30 tablet, Rfl: 0    vortioxetine (Trintellix) 5 mg tablet tablet, Take 1 tablet (5 mg) by mouth once daily., Disp: 30 tablet, Rfl: 0    SOCIAL HISTORY  Living situation Lives with family   Provider agency None currently   Work or day program None currently   School [employer]   Guardianship Mother (Jael)   SSA ?   Bx Specialist ?   Nicotine None   Alcohol None   Other drugs None     OBJECTIVE    Lab Results   Component Value Date    HGB 15.1 10/16/2024     10/16/2024    NEUTROABS 7.33 02/04/2023    GLUCOSE 93 10/16/2024     10/16/2024    K 4.0 10/16/2024    CO2 31 10/16/2024    CALCIUM 9.1 10/16/2024    CREATININE 0.87 10/16/2024    AST 20 10/16/2024    ALT 13 10/16/2024    AMMONIA 29 07/06/2021    TSH 0.63 10/16/2024    CHOL 238 (H) 10/16/2024    LDLF 127 (H) 01/12/2018    TRIG 134 10/16/2024    [id number] 667 06/17/2022     Therapeutic Drug Monitoring  N/A    Electrocardiograms  07/07/2021: NSR, VR 80, QTc 426    MENTAL STATUS EXAM  General/Appearance: Appropriate dress/hygiene/grooming.  Attitude/Behavior: Superficially cooperative.  Speech/Communication: Normal volume/rate/tone. Some stuttering.  Motor:  Dystonia.   Gait: Stiff. Unsteady.  Mood:  Impatient. Irritated.  Affect: Congruent. Dysphoric.  Thought processes: [place].  Thought content: Unable to assess.  Perception: Does not appear to be experiencing or responding to hallucinations.  Sensorium/Cognition: Oriented to self and surroundings. Intellectual impairment.  Memory: Recent & remote recall is intact.  Insight: Minimal.  Judgment: Fair.    ASSESSMENT  Some initial response since starting  vortioxetine.  Still some very significant and clinically impairing symptoms of depression, anxiety, and OCD.  Would definitely try to optimize dose of vortioxetine first before we consider whether or not we might need to add on another treatment to help augment the vortioxetine.  Agree with parents' current use of clonazepam.  At this time I do not suspect that there is any early evidence of mild cognitive impairment or early signs of dementia.    PROBLEM LIST  Intellectual developmental disorder, mild, secondary to Down syndrome  Anxiety  OCD  Tardive dyskinesia    PLAN  -- Increase vortioxetine to 20mg daily for depression/anxiety  -- Continue clonazepam 0.5mg - 1mg - 0.5mg for anxiety and dystonia   -- PRN: clonazepam 0.5mg up to twice daily  -- Follow up 8 weeks    Pancho Garcia MD    Prep time on date of the patient encounter: 0 minutes   Time spent directly with patient/family/caregiver: 50 minutes   Additional time spent on patient care activities: 0 minutes   Documentation time: 10 minutes   Other time spent: 0 minutes   Total time on date of patient encounter: 60 minutes

## 2025-01-29 DIAGNOSIS — E03.9 HYPOTHYROIDISM, UNSPECIFIED TYPE: ICD-10-CM

## 2025-01-29 RX ORDER — LEVOTHYROXINE SODIUM 112 UG/1
112 TABLET ORAL DAILY
Qty: 90 TABLET | Refills: 1 | Status: SHIPPED | OUTPATIENT
Start: 2025-01-29 | End: 2025-01-30 | Stop reason: SDUPTHER

## 2025-01-30 ENCOUNTER — TELEPHONE (OUTPATIENT)
Dept: PRIMARY CARE | Facility: CLINIC | Age: 50
End: 2025-01-30
Payer: MEDICARE

## 2025-01-30 DIAGNOSIS — E03.9 HYPOTHYROIDISM, UNSPECIFIED TYPE: ICD-10-CM

## 2025-01-31 RX ORDER — LEVOTHYROXINE SODIUM 112 UG/1
112 TABLET ORAL DAILY
Qty: 90 TABLET | Refills: 1 | Status: SHIPPED | OUTPATIENT
Start: 2025-01-31 | End: 2026-01-31

## 2025-02-01 DIAGNOSIS — F42.2 MIXED OBSESSIONAL THOUGHTS AND ACTS: Primary | ICD-10-CM

## 2025-02-01 DIAGNOSIS — F41.9 ANXIETY: ICD-10-CM

## 2025-02-03 RX ORDER — DOXYCYCLINE 100 MG/1
100 CAPSULE ORAL DAILY
COMMUNITY
Start: 2025-01-31

## 2025-02-03 RX ORDER — CLONAZEPAM 1 MG/1
1 TABLET ORAL 3 TIMES DAILY
Qty: 90 TABLET | Refills: 0 | Status: SHIPPED | OUTPATIENT
Start: 2025-02-03

## 2025-02-14 ENCOUNTER — APPOINTMENT (OUTPATIENT)
Dept: BEHAVIORAL HEALTH | Facility: CLINIC | Age: 50
End: 2025-02-14
Payer: MEDICARE

## 2025-02-14 DIAGNOSIS — G24.01 TARDIVE DYSTONIA: ICD-10-CM

## 2025-02-14 DIAGNOSIS — Q90.9 DOWN SYNDROME: ICD-10-CM

## 2025-02-14 DIAGNOSIS — F42.2 MIXED OBSESSIONAL THOUGHTS AND ACTS: Primary | ICD-10-CM

## 2025-02-14 DIAGNOSIS — F91.3 OPPOSITIONAL DEFIANT DISORDER: ICD-10-CM

## 2025-02-14 DIAGNOSIS — F41.9 ANXIETY: ICD-10-CM

## 2025-02-14 DIAGNOSIS — F79 INTELLECTUAL DISABILITY: ICD-10-CM

## 2025-02-14 PROCEDURE — 99215 OFFICE O/P EST HI 40 MIN: CPT | Performed by: PSYCHIATRY & NEUROLOGY

## 2025-02-14 PROCEDURE — G2211 COMPLEX E/M VISIT ADD ON: HCPCS | Performed by: PSYCHIATRY & NEUROLOGY

## 2025-02-14 ASSESSMENT — ENCOUNTER SYMPTOMS
WEAKNESS: 1
ABDOMINAL PAIN: 0
SEIZURES: 0
CONSTIPATION: 0
NECK STIFFNESS: 1
TROUBLE SWALLOWING: 0
SLEEP DISTURBANCE: 0
CHOKING: 0
DIARRHEA: 0
EYE PAIN: 0
DYSPHORIC MOOD: 1
ACTIVITY CHANGE: 0
VOMITING: 0

## 2025-02-14 NOTE — PROGRESS NOTES
"Outpatient Adult IDD Psychiatry    A HIPAA-compliant interactive audio and video telecommunication system which permits real time communications between the patient (at the originating site) and provider (at the distant site) was utilized to provide this telehealth service.     The patient, family, caregivers and guardian (as appropriate) have provided consent to conduct treatment via this telehealth service.      The patient's identity and physical location were verified at the time of this visit.     Present for appointment: Jalen and mom/guardian (Jael).    Appointment location: Home.  [address] [place] [address]     SUBJECTIVE    Jalen has not had any significant or acute health problems/concerns since our last visit.    He has now been taking vortioxetine at 20mg per day since our last visit 8 weeks ago.    Jalen essentially refused any real interaction with me for today's virtual appointment, aside from just briefly saying abrahan.    Mom says she observes his mood to be worse over the past 8-12 weeks.  While he doesn't necessarily seem sad, mopey, or apathetic, he is notably more irritable and argumentative.  He is almost \"reflexively oppositional\" in any discussion about any topic.  He will often ask repetitive questions, but less for reassurance and more to find something to \"pick a fight\" over with mom or dad.    His rigid insistence on adhering to his idiosyncratic schedules/routines has become more debilitating.  He still insists on doing certain things (eating, toileting, going to bed) at exactly specific times of day.  Mom offers the following example: For some reason, Jalen has decided that he only wants to talk to his life-long friend Alex on Saturdays for some unknown reason, even though they used to talk all the time during the week in the past.  Now if Alex calls on a day other than Saturday, Jalen will become livid, yelling \"why is he calling; he's only supposed to call on " "Saturday.\"    Mom recalls that this moodiness and stubbornness is all very reminiscent of how Jalen behaved many years ago, before starting fluoxetine, and that he had improved so dramatically while taking fluoxetine and aripiprazole until he started developing some dystonia in his hands/arms from the aripiprazole.    It doesn't seem like there has been any clear cognitive decline for Jalen over the past few months.  We did complete an NTG-EDSD in October 2024, with a number of \"new\" symptoms identified.  However, almost all of those fell within the Behavior & Affect category and were felt to be non-specific in the context of his background depression, anxiety, and OCD.    Mom also notes that his cervical dystonia, spasticity, and ability to ambulate have all deteriorated over the past few months.  She contacted the PI of a clinical study of TMS for cervical dystonia at [facility], but it didn't sound like Jalen would be a suitable candidate due to the other associated study requirements.    Review of Systems   Constitutional:  Negative for activity change.   HENT:  Negative for trouble swallowing.    Eyes:  Negative for pain and visual disturbance.   Respiratory:  Negative for choking.    Gastrointestinal:  Negative for abdominal pain, constipation, diarrhea and vomiting.   Musculoskeletal:  Positive for gait problem and neck stiffness.   Neurological:  Positive for weakness. Negative for seizures and syncope.   Psychiatric/Behavioral:  Positive for behavioral problems and dysphoric mood. Negative for self-injury, sleep disturbance and suicidal ideas.       Controlled Substance Evaluation  OARRS/PDMP reviewed: Pancho Garcia MD on 2/16/2025 11:15 AM  Is the patient prescribed a combination of a benzodiazepine and opioid? No  I have personally reviewed the OARRS report for Jalen Blackwell.   I have considered the risks of abuse, dependence, addiction and diversion.    I believe that it is clinically " appropriate for Jalen Blackwell to be prescribed this medication.    Last Urine Drug Screen:  Recent Results (from the past 8760 hours)   Confirmation Opiate/Opioid/Benzo Prescription Compliance    Collection Time: 05/31/24  2:42 PM   Result Value Ref Range    Clonazepam <25 <25 ng/mL    7-Aminoclonazepam 159 (H) <25 ng/mL    Alprazolam <25 <25 ng/mL    Alpha-Hydroxyalprazolam <25 <25 ng/mL    Midazolam <25 <25 ng/mL    Alpha-Hydroxymidazolam <25 <25 ng/mL    Chlordiazepoxide <25 <25 ng/mL    Diazepam <25 <25 ng/mL    Nordiazepam <25 <25 ng/mL    Temazepam <25 <25 ng/mL    Oxazepam <25 <25 ng/mL    Lorazepam <25 <25 ng/mL    Methadone <25 <25 ng/mL    EDDP <25 <25 ng/mL    6-Acetylmorphine <25 <25 ng/mL    Codeine <50 <50 ng/mL    Hydrocodone <25 <25 ng/mL    Hydromorphone <25 <25 ng/mL    Morphine  <50 <50 ng/mL    Norhydrocodone <25 <25 ng/mL    Noroxycodone <25 <25 ng/mL    Oxycodone <25 <25 ng/mL    Oxymorphone <25 <25 ng/mL    Fentanyl <2.5 <2.5 ng/mL    Norfentanyl <2.5 <2.5 ng/mL    Tramadol <50 <50 ng/mL    O-Desmethyltramadol <50 <50 ng/mL    Zolpidem <25 <25 ng/mL    Zolpidem Metabolite (ZCA) <25 <25 ng/mL   Screen Opiate/Opioid/Benzo Prescription Compliance    Collection Time: 05/31/24  2:42 PM   Result Value Ref Range    Creatinine, Urine Random 23.5 20.0 - 370.0 mg/dL    Amphetamine Screen, Urine Presumptive Negative Presumptive Negative    Barbiturate Screen, Urine Presumptive Negative Presumptive Negative    Cannabinoid Screen, Urine Presumptive Negative Presumptive Negative    Cocaine Metabolite Screen, Urine Presumptive Negative Presumptive Negative    PCP Screen, Urine Presumptive Negative Presumptive Negative     Controlled Substance Agreement: 05/24/2024  Reviewed Controlled Substance Agreement, including but not limited to:  Benefits, risks, and alternatives to treatment with a controlled substance medication(s).    Prescribed Controlled Substances:  > Benzodiazepines: Clonazepam  What is the  patient's goal of therapy? Reduced anxiety and spasticity  Is this being achieved with current treatment? Yes  Activities of Daily Living:   Is your overall impression that this patient is benefiting (symptom reduction outweighs side effects) from benzodiazepine therapy? Yes   1. Physical Functioning: Worse  2. Family Relationship: Worse  3. Social Relationship: Worse  4. Mood: Worse  5. Sleep Patterns: Same  6. Overall Function: Worse     MEDICATION HISTORY  Aripiprazole - excellent response but caused dystonia  Brexpiprazole - caused TD  Fluoxetine - up to 60mg/day with good response in the past  Paroxetine - up to 60mg/day  Ingrezza - caused Parkinsonism  Sinemet - to address Parkinsonian effects of VMAT2 inhibitor  Vortioxetine - no clear benefit    CURRENT MEDICATIONS    Current Outpatient Medications:     clobetasol (Temovate) 0.05 % external solution, Apply 0.05 Applications topically 2 times a day., Disp: , Rfl:     clonazePAM (KlonoPIN) 1 mg tablet, Take 1 tablet (1 mg) by mouth 3 times a day., Disp: 90 tablet, Rfl: 1    cyanocobalamin (Vitamin B-12) 250 mcg tablet, TAKE 1 TABLET BY MOUTH EVERY DAY AS DIRECTED, Disp: 90 tablet, Rfl: 3    docusate sodium (Colace) 50 mg/5 mL oral liquid, Take 5 mL (50 mg) by mouth once daily., Disp: 473 mL, Rfl: 5    doxycycline (Vibramycin) 100 mg capsule, Take 1 capsule (100 mg) by mouth once daily., Disp: , Rfl:     famotidine (Pepcid) 20 mg tablet, TAKE 1 TABLET BY MOUTH EVERY DAY AT BEDTIME AS NEEDED, Disp: 90 tablet, Rfl: 1    hydrocortisone 2.5 % cream, Apply topically 2 times a day. to affected area, Disp: , Rfl:     ketoconazole (NIZOral) 2 % shampoo, APPLY TO SCALP AS DIRECTED., Disp: , Rfl:     levothyroxine (Synthroid, Levoxyl) 112 mcg tablet, Take 1 tablet (112 mcg) by mouth early in the morning.. Take on an empty stomach at the same time each day, either 30 to 60 minutes prior to breakfast, Disp: 90 tablet, Rfl: 1    melatonin 5 mg tablet, Take 1 tablet (5 mg)  by mouth once daily at bedtime., Disp: , Rfl:     midodrine (Proamatine) 10 mg tablet, Take 1 tablet (10 mg) by mouth 3 times a day., Disp: , Rfl:     multivitamin (Daily-Ricardo, with folic acid,) tablet, Take 1 tablet by mouth once daily., Disp: 90 tablet, Rfl: 3    omeprazole (PriLOSEC) 40 mg DR capsule, TAKE 1 CAPSULE BY MOUTH ONCE DAILY, Disp: 90 capsule, Rfl: 2    psyllium (Metamucil) powder, Take 2 teaspoons in 8 oz. Liquid daily in the morning- one month supply, Disp: , Rfl:     rosuvastatin (Crestor) 5 mg tablet, Take 1 tablet (5 mg) by mouth once daily., Disp: 90 tablet, Rfl: 1    sodium chloride 1,000 mg tablet, Take 1 tablet (1 g) by mouth 2 times a day., Disp: , Rfl:     triamcinolone (Kenalog) 0.1 % cream, Apply 1 Film topically 2 times a day., Disp: , Rfl:     Vitamin D3 25 mcg (1,000 unit) tablet, TAKE 1 TABLET BY MOUTH EVERY DAY, Disp: 90 tablet, Rfl: 2    vortioxetine (Trintellix) 5 mg tablet tablet, Take 2 tablets (10 mg) by mouth once daily for 7 days, THEN 1 tablet (5 mg) once daily for 7 days., Disp: 21 tablet, Rfl: 0    SOCIAL HISTORY  Living situation Lives with family   Provider agency None currently   Work or day program None currently   School [employer]   Guardianship Mother (Jael)   SSA ?   Bx Specialist ?   Nicotine None   Alcohol None   Other drugs None     OBJECTIVE    Lab Results   Component Value Date    HGB 15.1 10/16/2024     10/16/2024    NEUTROABS 7.33 02/04/2023    GLUCOSE 93 10/16/2024     10/16/2024    K 4.0 10/16/2024    CO2 31 10/16/2024    CALCIUM 9.1 10/16/2024    CREATININE 0.87 10/16/2024    AST 20 10/16/2024    ALT 13 10/16/2024    AMMONIA 29 07/06/2021    TSH 0.63 10/16/2024    CHOL 238 (H) 10/16/2024    LDLF 127 (H) 01/12/2018    TRIG 134 10/16/2024    [id number] 667 06/17/2022     Therapeutic Drug Monitoring  N/A    Electrocardiograms  07/07/2021: NSR, VR 80, QTc 426    MENTAL STATUS EXAM  General/Appearance: Appropriate dress/hygiene/grooming.  Sitting on the couch.  Attitude/Behavior: Superficially cooperative.  Speech/Communication: Normal volume/rate/tone. Some stuttering.  Motor:  Next extended back against couch.   Gait: Not assessed at this visit.  Mood:  Impatient. Irritated.  Affect: Congruent. Dysphoric.  Thought processes: [place].  Thought content:  Says hi but tells mom he doesn't want to talk to me.  Perception: Does not appear to be experiencing or responding to hallucinations.  Sensorium/Cognition: Oriented to self and surroundings. Intellectual impairment.  Memory: Recent & remote recall is intact.  Insight: Minimal.  Judgment:  Impaired.    ASSESSMENT  Overall, there doesn't seem to be any evidence that we are getting significant or sustained benefits from vortioxetine at the recommended max dose of 20mg per day.  Given the possibility of developing adverse effects with use of other pharmacological agents, mom is interested in pursuing neuromodulation therapy (rTMS), and would like a referral over to [facility]'s program.  She wants to taper him off the vortioxetine, which is not unreasonable given the minimal improvement we've seen.    PROBLEM LIST  Intellectual developmental disorder, mild, secondary to Down syndrome  OCD  Tardive dystonia    PLAN  -- Taper and discontinue vortioxetine  -- Continue clonazepam 1mg TID for anxiety and dystonia   -- Follow up about 2 months    Pancho Garcia MD    Prep time on date of the patient encounter: 0 minutes   Time spent directly with patient/family/caregiver: 50 minutes   Additional time spent on patient care activities: 0 minutes   Documentation time: 10 minutes   Other time spent: 0 minutes   Total time on date of patient encounter: 60 minutes

## 2025-02-15 PROBLEM — F42.2 MIXED OBSESSIONAL THOUGHTS AND ACTS: Status: ACTIVE | Noted: 2023-02-15

## 2025-02-15 PROBLEM — G21.9 SECONDARY PARKINSONISM, UNSPECIFIED SECONDARY PARKINSONISM TYPE (MULTI): Status: RESOLVED | Noted: 2024-10-03 | Resolved: 2025-02-15

## 2025-02-15 PROBLEM — M79.89 SWELLING OF LOWER EXTREMITY: Status: RESOLVED | Noted: 2023-07-31 | Resolved: 2025-02-15

## 2025-02-15 PROBLEM — R91.1 LUNG NODULE: Status: RESOLVED | Noted: 2023-02-15 | Resolved: 2025-02-15

## 2025-02-15 PROBLEM — F91.3 OPPOSITIONAL DEFIANT DISORDER: Status: ACTIVE | Noted: 2025-02-15

## 2025-02-15 PROBLEM — M79.674 PAIN IN TOES OF BOTH FEET: Status: RESOLVED | Noted: 2021-05-11 | Resolved: 2025-02-15

## 2025-02-15 PROBLEM — R35.0 URINARY FREQUENCY: Status: RESOLVED | Noted: 2023-02-15 | Resolved: 2025-02-15

## 2025-02-15 PROBLEM — M79.675 PAIN IN TOES OF BOTH FEET: Status: RESOLVED | Noted: 2021-05-11 | Resolved: 2025-02-15

## 2025-02-15 RX ORDER — CLONAZEPAM 1 MG/1
1 TABLET ORAL 3 TIMES DAILY
Qty: 90 TABLET | Refills: 1 | Status: SHIPPED | OUTPATIENT
Start: 2025-02-15

## 2025-02-15 NOTE — PATIENT INSTRUCTIONS
1: Taper and discontinue vortioxetine: 10mg/day for one week, then 5mg/day for one week, then STOP.  2: Will complete referral packet to TMS clinic at [facility].

## 2025-03-13 DIAGNOSIS — Z00.00 ROUTINE GENERAL MEDICAL EXAMINATION AT HEALTH CARE FACILITY: ICD-10-CM

## 2025-03-14 RX ORDER — MULTIVITAMIN WITH FOLIC ACID 400 MCG
1 TABLET ORAL DAILY
Qty: 90 TABLET | Refills: 3 | Status: SHIPPED | OUTPATIENT
Start: 2025-03-14

## 2025-03-20 ENCOUNTER — APPOINTMENT (OUTPATIENT)
Dept: RADIOLOGY | Facility: HOSPITAL | Age: 50
End: 2025-03-20
Payer: MEDICARE

## 2025-04-17 ENCOUNTER — APPOINTMENT (OUTPATIENT)
Dept: BEHAVIORAL HEALTH | Facility: CLINIC | Age: 50
End: 2025-04-17
Payer: MEDICARE

## 2025-04-21 ENCOUNTER — APPOINTMENT (OUTPATIENT)
Dept: PRIMARY CARE | Facility: CLINIC | Age: 50
End: 2025-04-21
Payer: MEDICARE

## 2025-04-24 ENCOUNTER — APPOINTMENT (OUTPATIENT)
Dept: BEHAVIORAL HEALTH | Facility: CLINIC | Age: 50
End: 2025-04-24
Payer: MEDICARE

## 2025-04-24 DIAGNOSIS — F79 INTELLECTUAL DISABILITY: ICD-10-CM

## 2025-04-24 DIAGNOSIS — Q90.9 DOWN SYNDROME: ICD-10-CM

## 2025-04-24 DIAGNOSIS — F42.2 MIXED OBSESSIONAL THOUGHTS AND ACTS: Primary | ICD-10-CM

## 2025-04-24 DIAGNOSIS — Z79.899 CHRONIC PRESCRIPTION BENZODIAZEPINE USE: ICD-10-CM

## 2025-04-24 DIAGNOSIS — E78.5 HYPERLIPIDEMIA, UNSPECIFIED HYPERLIPIDEMIA TYPE: ICD-10-CM

## 2025-04-24 DIAGNOSIS — G24.01 TARDIVE DYSKINESIA: ICD-10-CM

## 2025-04-24 DIAGNOSIS — G24.01 TARDIVE DYSTONIA: ICD-10-CM

## 2025-04-24 PROCEDURE — 99215 OFFICE O/P EST HI 40 MIN: CPT | Performed by: PSYCHIATRY & NEUROLOGY

## 2025-04-24 PROCEDURE — G2211 COMPLEX E/M VISIT ADD ON: HCPCS | Performed by: PSYCHIATRY & NEUROLOGY

## 2025-04-24 PROCEDURE — 1036F TOBACCO NON-USER: CPT | Performed by: PSYCHIATRY & NEUROLOGY

## 2025-04-24 RX ORDER — ONDANSETRON 4 MG/1
4 TABLET, FILM COATED ORAL DAILY
Qty: 30 TABLET | Refills: 2 | Status: SHIPPED | OUTPATIENT
Start: 2025-04-24

## 2025-04-24 RX ORDER — CLONAZEPAM 1 MG/1
1 TABLET ORAL 3 TIMES DAILY
Qty: 90 TABLET | Refills: 2 | Status: SHIPPED | OUTPATIENT
Start: 2025-04-24

## 2025-04-24 RX ORDER — ROSUVASTATIN CALCIUM 5 MG/1
5 TABLET, COATED ORAL DAILY
Qty: 90 TABLET | Refills: 1 | Status: SHIPPED | OUTPATIENT
Start: 2025-04-24

## 2025-04-24 ASSESSMENT — ENCOUNTER SYMPTOMS
ABDOMINAL PAIN: 0
CONSTIPATION: 0
SEIZURES: 0
NECK STIFFNESS: 1
TROUBLE SWALLOWING: 0
DIARRHEA: 0
VOMITING: 0
WEAKNESS: 1
DYSPHORIC MOOD: 1
EYE PAIN: 0
CHOKING: 0
SLEEP DISTURBANCE: 0
ACTIVITY CHANGE: 0

## 2025-04-24 NOTE — PROGRESS NOTES
Outpatient Adult IDD Psychiatry    A HIPAA-compliant interactive audio and video telecommunication system which permits real time communications between the patient (at the originating site) and provider (at the distant site) was utilized to provide this telehealth service.     The patient, family, caregivers and guardian (as appropriate) have provided consent to conduct treatment via this telehealth service.      The patient's identity and physical location were verified at the time of this visit.     Present for appointment: Jalen and mom/guardian (Jael).    Appointment location: Home.  [address]  [place] [address]     SUBJECTIVE    Now being treated by Dr. Vincent Chapa at [facility].      Started ECT treatments on Monday (4/21).  He will have treatments 3x per week for 3 weeks and then reassess response.  He was quite distressed and in a lot of discomfort following the initial treatment, but did better on Wednesday with prophylactic Zofran and Toradol following the procedure.  However, mom notes that immediately after the procedure he was demanding to be given his mid-day dose of clonazepam so as not to miss his expected dose time.      So far no adverse response to treatments.  No noticeable worsening of symptoms since tapering off vortioxetine.      Behaviorally, Jalen is unchanged at home.  He remains extremely rigid and oppositional, dictating all of the planned schedules and routines within the house.      Mom started supplemental N-AC and Dr. Chapa's note also suggested possibly adding a trial of ondansetron as an off-label treatment for OCD.    Review of Systems   Constitutional:  Negative for activity change.   HENT:  Negative for trouble swallowing.    Eyes:  Negative for pain and visual disturbance.   Respiratory:  Negative for choking.    Gastrointestinal:  Negative for abdominal pain, constipation, diarrhea and vomiting.   Musculoskeletal:  Positive for gait problem and  neck stiffness.   Neurological:  Positive for weakness. Negative for seizures and syncope.   Psychiatric/Behavioral:  Positive for behavioral problems and dysphoric mood. Negative for self-injury and sleep disturbance.       Controlled Substance Evaluation  OARRS/PDMP reviewed: Pancho Garcia MD on 4/24/2025  8:59 AM  Is the patient prescribed a combination of a benzodiazepine and opioid? No  I have personally reviewed the OARRS report for Jalen Blackwell.   I have considered the risks of abuse, dependence, addiction and diversion.    I believe that it is clinically appropriate for Jalen Blackwell to be prescribed this medication.    Last Urine Drug Screen:  Recent Results (from the past 8760 hours)   Confirmation Opiate/Opioid/Benzo Prescription Compliance    Collection Time: 05/31/24  2:42 PM   Result Value Ref Range    Clonazepam <25 <25 ng/mL    7-Aminoclonazepam 159 (H) <25 ng/mL    Alprazolam <25 <25 ng/mL    Alpha-Hydroxyalprazolam <25 <25 ng/mL    Midazolam <25 <25 ng/mL    Alpha-Hydroxymidazolam <25 <25 ng/mL    Chlordiazepoxide <25 <25 ng/mL    Diazepam <25 <25 ng/mL    Nordiazepam <25 <25 ng/mL    Temazepam <25 <25 ng/mL    Oxazepam <25 <25 ng/mL    Lorazepam <25 <25 ng/mL    Methadone <25 <25 ng/mL    EDDP <25 <25 ng/mL    6-Acetylmorphine <25 <25 ng/mL    Codeine <50 <50 ng/mL    Hydrocodone <25 <25 ng/mL    Hydromorphone <25 <25 ng/mL    Morphine  <50 <50 ng/mL    Norhydrocodone <25 <25 ng/mL    Noroxycodone <25 <25 ng/mL    Oxycodone <25 <25 ng/mL    Oxymorphone <25 <25 ng/mL    Fentanyl <2.5 <2.5 ng/mL    Norfentanyl <2.5 <2.5 ng/mL    Tramadol <50 <50 ng/mL    O-Desmethyltramadol <50 <50 ng/mL    Zolpidem <25 <25 ng/mL    Zolpidem Metabolite (ZCA) <25 <25 ng/mL   Screen Opiate/Opioid/Benzo Prescription Compliance    Collection Time: 05/31/24  2:42 PM   Result Value Ref Range    Creatinine, Urine Random 23.5 20.0 - 370.0 mg/dL    Amphetamine Screen, Urine Presumptive Negative Presumptive  Negative    Barbiturate Screen, Urine Presumptive Negative Presumptive Negative    Cannabinoid Screen, Urine Presumptive Negative Presumptive Negative    Cocaine Metabolite Screen, Urine Presumptive Negative Presumptive Negative    PCP Screen, Urine Presumptive Negative Presumptive Negative     Controlled Substance Agreement: 05/24/2024  Reviewed Controlled Substance Agreement, including but not limited to:  Benefits, risks, and alternatives to treatment with a controlled substance medication(s).    Prescribed Controlled Substances:  > Benzodiazepines: Clonazepam  What is the patient's goal of therapy? Reduced anxiety and spasticity  Is this being achieved with current treatment? Yes  Activities of Daily Living:   Is your overall impression that this patient is benefiting (symptom reduction outweighs side effects) from benzodiazepine therapy? Yes   1. Physical Functioning: Worse  2. Family Relationship: Worse  3. Social Relationship: Worse  4. Mood: Worse  5. Sleep Patterns: Same  6. Overall Function: Worse     MEDICATION HISTORY  Aripiprazole - excellent response but caused dystonia  Brexpiprazole - caused TD  Fluoxetine - up to 60mg/day with good response in the past  Paroxetine - up to 60mg/day  Ingrezza - caused Parkinsonism  Sinemet - to address Parkinsonian effects of VMAT2 inhibitor  Vortioxetine - no clear benefit    CURRENT MEDICATIONS  Medications Prior to Visit[1]    SOCIAL HISTORY  Living situation Lives with family   Provider agency None currently   Work or day program None currently   School [employer]   Guardianship Mother (Jael)   SSA ?   Bx Specialist ?   Nicotine None   Alcohol None   Other drugs None     OBJECTIVE    Lab Results   Component Value Date    HGB 15.1 10/16/2024     10/16/2024    NEUTROABS 7.33 02/04/2023    GLUCOSE 93 10/16/2024     10/16/2024    K 4.0 10/16/2024    CO2 31 10/16/2024    CALCIUM 9.1 10/16/2024    CREATININE 0.87 10/16/2024    AST 20 10/16/2024     ALT 13 10/16/2024    AMMONIA 29 07/06/2021    TSH 0.63 10/16/2024    CHOL 238 (H) 10/16/2024    LDLF 127 (H) 01/12/2018    TRIG 134 10/16/2024    BGGIPHCI59 667 06/17/2022     Therapeutic Drug Monitoring  N/A    Electrocardiograms  07/07/2021: NSR, VR 80, QTc 426    MENTAL STATUS EXAM  General/Appearance: Appropriate dress/hygiene/grooming. Sitting on the couch.  Attitude/Behavior: Hostile.  Speech/Communication:  Refuses to engage.  Motor:  Next extended back against couch.   Gait: Not assessed at this visit.  Mood:  Irritated.  Affect: Congruent. Dysphoric.  Thought processes: [place].  Thought content: Unable to assess.  Perception: Does not appear to be experiencing or responding to hallucinations.  Sensorium/Cognition: Oriented to self and surroundings. Intellectual impairment.  Memory: Not directly assessed at this time.  Insight: Minimal.  Judgment:  Impaired.    ASSESSMENT  Will continue to follow along with Jalen while he is receiving ECT at [facility].  We will continue scheduled clonazepam and we can try adding a trial of ondansetron for the OCD (Jacob ER, Zoltan KA, Robert LB, et al. Randomized Controlled Trial of the Effects of High-Dose Ondansetron on Clinical Symptoms and Brain Connectivity in Obsessive-Compulsive and Tic Disorders. Am J Psychiatry. 2025;182(3):285-296).  He is also due for annual completion of his urine drug screen and Controlled Substance Agreement.    PROBLEM LIST  Intellectual developmental disorder, mild, secondary to Down syndrome  OCD  Tardive dystonia    PLAN  -- Start trial of ondansetron 4mg daily for OCD  -- Continue clonazepam 1mg TID for anxiety, OCD, and dystonia   -- Follow up 2 months    Pancho Garcia MD    Prep time on date of the patient encounter: 10 minutes   Time spent directly with patient/family/caregiver: 40 minutes   Additional time spent on patient care activities: 0 minutes   Documentation time: 10 minutes   Other time spent: 0 minutes    Total time on date of patient encounter: 60 minutes          [1]   Outpatient Medications Prior to Visit   Medication Sig Dispense Refill    clobetasol (Temovate) 0.05 % external solution Apply 0.05 Applications topically 2 times a day.      clonazePAM (KlonoPIN) 1 mg tablet Take 1 tablet (1 mg) by mouth 3 times a day. 90 tablet 1    cyanocobalamin (Vitamin B-12) 250 mcg tablet TAKE 1 TABLET BY MOUTH EVERY DAY AS DIRECTED 90 tablet 3    docusate sodium (Colace) 50 mg/5 mL oral liquid Take 5 mL (50 mg) by mouth once daily. 473 mL 5    doxycycline (Vibramycin) 100 mg capsule Take 1 capsule (100 mg) by mouth once daily.      famotidine (Pepcid) 20 mg tablet TAKE 1 TABLET BY MOUTH EVERY DAY AT BEDTIME AS NEEDED 90 tablet 1    hydrocortisone 2.5 % cream Apply topically 2 times a day. to affected area      ketoconazole (NIZOral) 2 % shampoo APPLY TO SCALP AS DIRECTED.      levothyroxine (Synthroid, Levoxyl) 112 mcg tablet Take 1 tablet (112 mcg) by mouth early in the morning.. Take on an empty stomach at the same time each day, either 30 to 60 minutes prior to breakfast 90 tablet 1    melatonin 5 mg tablet Take 1 tablet (5 mg) by mouth once daily at bedtime.      midodrine (Proamatine) 10 mg tablet Take 1 tablet (10 mg) by mouth 3 times a day.      multivitamin (Daily-Ricardo, with folic acid,) tablet Take 1 tablet by mouth once daily. 90 tablet 3    omeprazole (PriLOSEC) 40 mg DR capsule TAKE 1 CAPSULE BY MOUTH ONCE DAILY 90 capsule 2    psyllium (Metamucil) powder Take 2 teaspoons in 8 oz. Liquid daily in the morning- one month supply      rosuvastatin (Crestor) 5 mg tablet TAKE 1 TABLET BY MOUTH EVERY DAY 90 tablet 1    sodium chloride 1,000 mg tablet Take 1 tablet (1 g) by mouth 2 times a day.      triamcinolone (Kenalog) 0.1 % cream Apply 1 Film topically 2 times a day.      Vitamin D3 25 mcg (1,000 unit) tablet TAKE 1 TABLET BY MOUTH EVERY DAY 90 tablet 2    vortioxetine (Trintellix) 5 mg tablet tablet Take 2 tablets  (10 mg) by mouth once daily for 7 days, THEN 1 tablet (5 mg) once daily for 7 days. 21 tablet 0     No facility-administered medications prior to visit.

## 2025-05-29 LAB
1OH-MIDAZOLAM UR-MCNC: NEGATIVE NG/ML
7AMINOCLONAZEPAM UR-MCNC: 335 NG/ML
A-OH ALPRAZ UR-MCNC: NEGATIVE NG/ML
A-OH-TRIAZOLAM UR-MCNC: NEGATIVE NG/ML
AMPHETAMINES UR QL: NEGATIVE NG/ML
BARBITURATES UR QL: NEGATIVE NG/ML
BENZODIAZ UR QL: POSITIVE NG/ML
BZE UR QL: NEGATIVE NG/ML
CREAT UR-MCNC: 36.9 MG/DL
DRUG SCREEN COMMENT UR-IMP: ABNORMAL
FENTANYL UR QL SCN: NEGATIVE NG/ML
LORAZEPAM UR-MCNC: NEGATIVE NG/ML
METHADONE UR QL: NEGATIVE NG/ML
NORDIAZEPAM UR-MCNC: NEGATIVE NG/ML
OH-ETHYLFLURAZ UR-MCNC: NEGATIVE NG/ML
OPIATES UR QL: NEGATIVE NG/ML
OXAZEPAM UR-MCNC: NEGATIVE NG/ML
OXIDANTS UR QL: NEGATIVE MCG/ML
OXYCODONE UR QL: NEGATIVE NG/ML
PCP UR QL: NEGATIVE NG/ML
PH UR: 5.6 [PH] (ref 4.5–9)
QUEST NOTES AND COMMENTS: ABNORMAL
TEMAZEPAM UR-MCNC: NEGATIVE NG/ML
THC UR QL: NEGATIVE NG/ML

## 2025-06-13 ENCOUNTER — APPOINTMENT (OUTPATIENT)
Dept: PRIMARY CARE | Facility: CLINIC | Age: 50
End: 2025-06-13
Payer: MEDICARE

## 2025-06-13 ENCOUNTER — OFFICE VISIT (OUTPATIENT)
Dept: PRIMARY CARE | Facility: CLINIC | Age: 50
End: 2025-06-13
Payer: MEDICARE

## 2025-06-13 VITALS
DIASTOLIC BLOOD PRESSURE: 70 MMHG | HEIGHT: 62 IN | HEART RATE: 84 BPM | SYSTOLIC BLOOD PRESSURE: 101 MMHG | OXYGEN SATURATION: 99 % | BODY MASS INDEX: 22.68 KG/M2

## 2025-06-13 DIAGNOSIS — E03.9 HYPOTHYROIDISM, UNSPECIFIED TYPE: ICD-10-CM

## 2025-06-13 DIAGNOSIS — D69.6 THROMBOCYTOPENIA: ICD-10-CM

## 2025-06-13 DIAGNOSIS — Z09 HOSPITAL DISCHARGE FOLLOW-UP: ICD-10-CM

## 2025-06-13 DIAGNOSIS — J69.0 ASPIRATION PNEUMONIA OF BOTH LUNGS, UNSPECIFIED ASPIRATION PNEUMONIA TYPE, UNSPECIFIED PART OF LUNG (MULTI): ICD-10-CM

## 2025-06-13 DIAGNOSIS — E78.5 HYPERLIPIDEMIA, UNSPECIFIED HYPERLIPIDEMIA TYPE: Primary | ICD-10-CM

## 2025-06-13 PROCEDURE — 99214 OFFICE O/P EST MOD 30 MIN: CPT | Performed by: INTERNAL MEDICINE

## 2025-06-13 PROCEDURE — G2211 COMPLEX E/M VISIT ADD ON: HCPCS | Performed by: INTERNAL MEDICINE

## 2025-06-13 PROCEDURE — 1036F TOBACCO NON-USER: CPT | Performed by: INTERNAL MEDICINE

## 2025-06-13 RX ORDER — AMOXICILLIN 500 MG/1
1000 TABLET, FILM COATED ORAL 3 TIMES DAILY
COMMUNITY
End: 2025-06-13 | Stop reason: ALTCHOICE

## 2025-06-13 RX ORDER — AMOXICILLIN AND CLAVULANATE POTASSIUM 875; 125 MG/1; MG/1
875 TABLET, FILM COATED ORAL 2 TIMES DAILY
Qty: 10 TABLET | Refills: 0 | Status: SHIPPED | OUTPATIENT
Start: 2025-06-13 | End: 2025-06-18

## 2025-06-13 RX ORDER — AMOXICILLIN AND CLAVULANATE POTASSIUM 875; 125 MG/1; MG/1
875 TABLET, FILM COATED ORAL 2 TIMES DAILY
Qty: 10 TABLET | Refills: 0 | Status: SHIPPED | OUTPATIENT
Start: 2025-06-13 | End: 2025-06-13

## 2025-06-13 ASSESSMENT — COLUMBIA-SUICIDE SEVERITY RATING SCALE - C-SSRS
1. IN THE PAST MONTH, HAVE YOU WISHED YOU WERE DEAD OR WISHED YOU COULD GO TO SLEEP AND NOT WAKE UP?: NO
6. HAVE YOU EVER DONE ANYTHING, STARTED TO DO ANYTHING, OR PREPARED TO DO ANYTHING TO END YOUR LIFE?: NO
2. HAVE YOU ACTUALLY HAD ANY THOUGHTS OF KILLING YOURSELF?: NO

## 2025-06-13 ASSESSMENT — ENCOUNTER SYMPTOMS
COUGH: 1
OCCASIONAL FEELINGS OF UNSTEADINESS: 1
CHOKING: 1

## 2025-06-13 NOTE — PROGRESS NOTES
"Subjective   Patient ID: Jalen Blackwell is a 49 y.o. male who presents for Hospital Follow-up.    HPI   Patient presented to the ED on 6/9/25 for infiltration of Port-A-Cath (placed on 6/2/25). He was at ECT earlier that day where they pushed meds through port which infiltrated. Noted that patient would have nonspecific soft tissue swelling of the R lower neck and upper chest surrounding his Port-A-Cath. No skin infection noted. Labs obtained returned unremarkable. CTA chest with IV contrast was negative for PE and showed stable 8 mm RLL pulmonary nodule and b/l infiltrates. Was given amoxicillin given patient's hx of aspiration PNA. He was discharged home with recommendation to apply warm compresses to swollen regions and follow up with pulmonology and vascular.     Guardian is concerned for x5 day of amoxicillin. Concerned of aspiration, previously was hospitalized for this. Wanting extended coverage until Pulm sees him Thurs. Noted more frequent coughing while eating since port was placed. States also gait has changed and behavior changes. Reported that he is overall improving with amoxicillin. No changes in mechanical soft diet and thin liquids.     Concerned for thrombocytopenia. Guardian denies fever, chills, bleeding, bruising, and signs of pain.     Concerned about pulmonary nodule increased to 8 mm since 2023 where it was noted to be 6 mm.     Review of Systems   Respiratory:  Positive for cough and choking.    All other systems reviewed and are negative.      Objective   /70   Pulse 84   Ht 1.575 m (5' 2\")   SpO2 99%   BMI 22.68 kg/m²     Physical Exam  Constitutional:       General: He is not in acute distress.     Appearance: He is not ill-appearing.   Cardiovascular:      Rate and Rhythm: Normal rate and regular rhythm.      Pulses: Normal pulses.      Heart sounds: Normal heart sounds.   Pulmonary:      Effort: Pulmonary effort is normal.      Comments: Decreased breath sounds in b/l lower " lobes  Abdominal:      General: Bowel sounds are normal.      Palpations: Abdomen is soft.      Tenderness: There is no abdominal tenderness. There is no guarding or rebound.   Musculoskeletal:      Right lower leg: No edema.      Left lower leg: No edema.   Skin:     General: Skin is warm and dry.      Findings: No erythema or rash.   Neurological:      Mental Status: He is alert.         Assessment/Plan   #Concern for aspiration  - On mechanical diet w/ thin liquids  - Increased coughing associated with eating/drinking  - Ordered MBSS, preferred [place]    #Port A Cath infiltration  - Swelling improved w/ warm compresses  - Follow up with Vascular at [facility]    #Thrombocytopenia  - Likely 2/2 current infection  - Hx of fluctuation, no hx of bleeding and bruising.  - Ordered CBC w/ diff    #Pulmonary nodule  - Follow up with pulmonology at [facility]  - Annual CT chest for surveillance    #HLD  - Cont rosuvastatin 5 mg daily  - Ordered lipid panel    #Hypothyroidism  - Ordered TSH    RTC 6 months    Donna Ceron DO  Internal Medicine PGY-1

## 2025-06-13 NOTE — PATIENT INSTRUCTIONS
Please complete fasting blood work. Fast for 10 hours, black coffee and water the morning of labs are OK.     Finish the amoxicillin and prescribed Augmentin for x5 days  Ordered MBSS for aspiration evaluation. Please call [phone] to schedule an appointment.     RTC 6 months

## 2025-06-24 ENCOUNTER — HOSPITAL ENCOUNTER (OUTPATIENT)
Dept: RADIOLOGY | Facility: HOSPITAL | Age: 50
Discharge: HOME | End: 2025-06-24
Payer: MEDICARE

## 2025-06-24 DIAGNOSIS — J69.0 ASPIRATION PNEUMONIA OF BOTH LUNGS, UNSPECIFIED ASPIRATION PNEUMONIA TYPE, UNSPECIFIED PART OF LUNG (MULTI): ICD-10-CM

## 2025-06-24 DIAGNOSIS — R13.12 OROPHARYNGEAL DYSPHAGIA: Primary | ICD-10-CM

## 2025-06-24 PROCEDURE — 92611 MOTION FLUOROSCOPY/SWALLOW: CPT | Mod: GN | Performed by: PHARMACIST

## 2025-06-24 PROCEDURE — 74230 X-RAY XM SWLNG FUNCJ C+: CPT | Performed by: RADIOLOGY

## 2025-06-24 PROCEDURE — 2500000005 HC RX 250 GENERAL PHARMACY W/O HCPCS: Performed by: BEHAVIOR TECHNICIAN

## 2025-06-24 PROCEDURE — 74230 X-RAY XM SWLNG FUNCJ C+: CPT

## 2025-06-24 RX ADMIN — BARIUM SULFATE 70 ML: 0.81 POWDER, FOR SUSPENSION ORAL at 13:52

## 2025-06-24 RX ADMIN — BARIUM SULFATE 70 ML: 400 SUSPENSION ORAL at 13:53

## 2025-06-24 RX ADMIN — BARIUM SULFATE 5 ML: 400 PASTE ORAL at 13:53

## 2025-06-24 NOTE — ADDENDUM NOTE
Encounter addended by: Ely Solorio, VINOD on: 6/24/2025 4:53 PM   Actions taken: Clinical Note Signed

## 2025-06-24 NOTE — PROCEDURES
"Speech-Language Pathology    Outpatient Modified Barium Swallow Study    Patient Name: Jalen Blackwell  MRN: [id number]  : 1975  Today's Date: 25  Time Calculation  Start Time: 1323  Stop Time: 1403  Time Calculation (min): 40 min        *See joint MBSS report under \"Imaging\" tab for complete comments from SLP and radiologist.*    Modified Barium Swallow Study completed. Informed verbal consent obtained prior to completion of exam. The study was completed per protocol with various liquid barium consistencies, pudding, solids and a 13mm barium tablet. AP view not assessed due to positional limitations. A 1.9 cm or .75 inch (outer diameter) ring was placed on the chin in order to complete objective measurements during swallowing. The anatomic structures and function of the oropharynx, larynx, hypopharynx and cervical esophagus were evaluated.    SLP: IZABELLA HARDIN S-SLP, Shivani Solorio SLP  Contact info: Available via secure chat      Reason for Referral: Recommended by pulmonologist due to bilateral infiltrates detected on chest CT. Mom reporting that he seems to cough more when eating.   Patient Hx: Down syndrome, oropharyngeal dysphagia, GERD, syncope and collapse, and tardive dystonia.   Respiratory Status: Room air  Current diet: Regular/thin cut into bite-sized pieces with extra moisture added as needed; parents avoid select dry/tough foods    Pain:  Pain Scale: 0-10  Ratin    FINAL SPEECH RECOMMENDATIONS    DIET:   - Regular/thin cut into bite-sized pieces with extra moisture added as needed; continue to avoid select dry/tough foods  - Thin liquids (IDDSI Level 0) VIA PROVALE CUP (10cc)  -Medications: Crushed in puree    STRATEGIES:  - Upright positioning for all PO intake  - Slow rate of intake  - Small/SINGLE sips  - One bite/sip at a time  - Alternate bites and sips  -All liquids via Provale cup 10cc  -No straws    SLP PLAN:  Skilled SLP Services: Skilled SLP intervention for dysphagia is " warranted.  SLP Frequency: To be determined by treating SLP in outpatient setting (recommend 3x/week if able due to poor compliance with home exercise program; parents report that pt cooperates with therapists but does not follow through when cued by parents)  Duration: to be determined by outpatient SLP  Treatment/Interventions:   - Simple oropharyngeal exercises (e.g., EMST, CTAR, Shaker, jaw opening against resistance, effortful swallow)  - Bolus trials  - Compensatory strategy training  - Diet tolerance/advancement  - Patient/caregiver education    Discussed POC: Patient  Discussed Risks/Benefits: Yes  Patient/Caregiver Agreeable: Yes    Short term goals established 06/24/25:   - Pt will consume prescribed diet (current diet is regular with modifications/thin) without overt s/sx aspiration/penetration in 95% of observed trials.  - Pt will demonstrate follow-through of trained compensatory strategies during a meal/snack with 90% acc given min cues.  - Pt will complete oral/pharyngeal/laryngeal strengthening exercises as directed given mod cues from SLP in order to improve swallow function.  - Pt/family will demonstrate understanding of education related to dysphagia independently.    Long term goals established 06/24/25:   Pt will meet 100% of nutrition/hydration needs by mouth, via least restrictive diet, without pulmonary compromise.      Education Provided: Pt's mom was educated re: role of ST, results of assessment, risk for aspiration, and recommended safe swallow strategies. She stated intent to purchase Provale cup to regulate sip size.    Additional Medical Consults Suggested:   - GI    Repeat study/ dc plan: Repeat MBSS as clinically indicated.       Mechanics of the Swallow Summary:  ORAL PHASE:  Lip Closure - Escape progressing to mid-chin   Tongue Control During Bolus Hold - Posterior escape of greater than half of the bolus   Bolus prep/mastication - Slow prolonged mastication with complete  re-collection necessary   Bolus transport/lingual motion - Repetitive/disorganized tongue motion (tongue pumping)   Oral residue - Residue collection on oral structure (piecemeal deglutition); reduced to trace amount with spontaneous reswallows)    PHARYNGEAL PHASE:  Initiation of pharyngeal swallow - Collection of bolus at the level of the pyriform sinus   Soft palate elevation - Trace column of contrast or air between soft palate and pharyngeal wall   Laryngeal elevation - Complete superior movement of thyroid cartilage with contact of arytenoids to epiglottic petiole   Anterior hyoid excursion - Partial anterior movement   Epiglottic movement - Partial inversion  Laryngeal vestibule closure - Incomplete - narrow column of air/contrast in laryngeal vestibule   Pharyngeal stripping wave - Present, however, diminished   Pharyngeal contraction (A/P view) - Not tested       Pharyngoesophageal segment opening - Minimal distension/incomplete duration with marked obstruction of flow of bolus (most notable with regular solid bolus)  Tongue base retraction - Wide column of contrast or air between tongue base and pharyngeal wall   Pharyngeal residue - Majority of contrast within or on the pharyngeal structures     ESOPHAGEAL PHASE:  Esophageal clearance - Not evaluated       SLP Impressions with Severity Rating:   Pt presents with moderate oropharyngeal dysphagia upon completion of modified barium swallow study this date. Swallowing physiology is detailed above. Impairments most impacting swallowing safety and efficiency include delayed initiation of pharyngeal swallow, diminished pharyngeal stripping wave, reduced UES opening, and reduced tongue base retraction. Pt impulsively took in larger boluses intermittently causing an increased risk for penetration/aspiration.  Patient demonstrated intermittent penetration of thin liquids and transient silent aspiration of thin liquids to the underside of the vocal folds x1 that  cleared. Pt demonstrated penetration of mildly think liquid during 20 mL when head was tilted back which caused premature spillage to the pyriforms. No further penetration or aspiration was visualized during study. Large amounts of pharyngeal residue was noted (especially after solid boluses) that could increase risk for penetration/aspiration. Liquid wash was effective in reducing but not fully clearing pharyngeal residue.    Intermittent premature closure of the UES obstructed majority of the bolus, mostly noted during  solid boluses. Pt may benefit from consideration of dilation of UES.       OUTCOME MEASURES:  Functional Oral Intake Scale  Functional Oral Intake Scale: Level 6        total oral diet with multiple consistencies without special preparations but specific food limitations       Eating Assessment Tool (EAT-10)   0=No problem, 1=Mild problem, 2=Mild to moderate problem, 3=Moderate problem, 4=Severe problem    EAT 10  My swallowing problem has caused me to lose weight.: 0  My swallowing problem interferes with my ability to go out for meals.: 0  Swallowing liquids takes extra effort.: 0  Swallowing solids takes extra effort.: 0  Swallowing pills takes extra effort.: 0  Swallowing is painful: 0  The pleasure of eating is affected by my swallowing.: 0  When I swallow food sticks in my throat.: 0  I cough when I eat.: 2  Swallowing is stressful: 0  EAT-10 TOTAL SCORE:: 2    A total score of 3 or above may indicate difficulty with swallowing safely and/or efficiently      Rosenbek's Penetration Aspiration Scale  Thin Liquids: 6. ASPIRATION that CLEARS with the swallow - contrast passes glottis, no subglottic residue  During the Swallow  Nectar Thick Liquids: 3. PENETRATION with LOW ASPIRATION risk - contrast remains above vocal cords, visible residue]   During the Swallow  After the Swallow  Puree: 1. NO ASPIRATION & NO PENETRATION - no aspiration, contrast does not enter airway  Solids: 1. NO ASPIRATION &  NO PENETRATION - no aspiration, contrast does not enter airway    Session was conducted and note was written by KAIDEN Alvarado-SLP. Supervising SLP was present for the entire session, made all clinical decisions, and agrees with the note as written.

## 2025-06-25 DIAGNOSIS — R13.10 DYSPHAGIA, UNSPECIFIED TYPE: Primary | ICD-10-CM

## 2025-06-27 ENCOUNTER — APPOINTMENT (OUTPATIENT)
Dept: BEHAVIORAL HEALTH | Facility: CLINIC | Age: 50
End: 2025-06-27
Payer: MEDICARE

## 2025-06-27 DIAGNOSIS — F32.1 CURRENT MODERATE EPISODE OF MAJOR DEPRESSIVE DISORDER, UNSPECIFIED WHETHER RECURRENT (MULTI): Primary | ICD-10-CM

## 2025-06-27 DIAGNOSIS — F42.2 MIXED OBSESSIONAL THOUGHTS AND ACTS: ICD-10-CM

## 2025-06-27 DIAGNOSIS — Q90.9 DOWN SYNDROME: ICD-10-CM

## 2025-06-27 DIAGNOSIS — F06.1 CATATONIA: ICD-10-CM

## 2025-06-27 DIAGNOSIS — F79 INTELLECTUAL DISABILITY: ICD-10-CM

## 2025-06-27 PROBLEM — G24.01 TARDIVE DYSKINESIA: Status: RESOLVED | Noted: 2023-02-15 | Resolved: 2025-06-27

## 2025-06-27 PROBLEM — G24.01 TARDIVE DYSTONIA: Status: RESOLVED | Noted: 2023-02-15 | Resolved: 2025-06-27

## 2025-06-27 PROCEDURE — G2211 COMPLEX E/M VISIT ADD ON: HCPCS | Performed by: PSYCHIATRY & NEUROLOGY

## 2025-06-27 PROCEDURE — 99215 OFFICE O/P EST HI 40 MIN: CPT | Performed by: PSYCHIATRY & NEUROLOGY

## 2025-06-27 PROCEDURE — 1036F TOBACCO NON-USER: CPT | Performed by: PSYCHIATRY & NEUROLOGY

## 2025-06-27 ASSESSMENT — ENCOUNTER SYMPTOMS
NECK STIFFNESS: 1
CHOKING: 0
EYE PAIN: 0
ABDOMINAL PAIN: 0
ACTIVITY CHANGE: 0
VOMITING: 0
DYSPHORIC MOOD: 1
SLEEP DISTURBANCE: 0
DIARRHEA: 0
CONSTIPATION: 0
TROUBLE SWALLOWING: 0
SEIZURES: 0
WEAKNESS: 1

## 2025-06-27 NOTE — PROGRESS NOTES
Outpatient Adult IDD Psychiatry    A HIPAA-compliant interactive audio and video telecommunication system which permits real time communications between the patient (at the originating site) and provider (at the distant site) was utilized to provide this telehealth service.     The patient, family, caregivers and guardian (as appropriate) have provided consent to conduct treatment via this telehealth service.      The patient's identity and physical location were verified at the time of this visit.     Present for appointment: Jalen and mom/guardian (Jael).    Appointment location: Home.  SHELBI Hobbs    SUBJECTIVE    He has now had a total of 21 ECT treatments as part of his acute series.    Mom and his team at [facility] have decided to transition him over to weekly maintenance ECT treatments starting next week, primarily due to the emergence of some new and worsening memory problems.  For instance, after a recent treatment session he came home and asked where the dog was (which had passed away many years ago).    Mom has noted an improvement in his mood with decreased obsessively rigid adherence to routines.  He's no longer eating, toileting, or going to bed at specific times.  He has even agreed to go out to see a movie with his parents this weekend for his birthday.    However, he can still be quite irritable, argumentative, and oppositional at times.  He is particularly defensive with any type of reminders or redirection from his parents, which they need to do quite often when he is eating or ambulating.    He had a recent swallow evaluation and continues to have some significant dysphagia and will need to start working with ST again two to three times a week.  Mom is planning to implement a Provale flow-limiting cup this week.    Interestingly, his abnormal posture and gait have improved substantially with ECT, possibly suggesting that these issues may have been more related to some sort of  catatonic phenomenon more so than chronic dystonia.    Review of Systems   Constitutional:  Negative for activity change.   HENT:  Negative for trouble swallowing.    Eyes:  Negative for pain and visual disturbance.   Respiratory:  Negative for choking.    Gastrointestinal:  Negative for abdominal pain, constipation, diarrhea and vomiting.   Musculoskeletal:  Positive for gait problem and neck stiffness.   Neurological:  Positive for weakness. Negative for seizures and syncope.   Psychiatric/Behavioral:  Positive for behavioral problems and dysphoric mood. Negative for self-injury and sleep disturbance.       Controlled Substance Evaluation  OARRS/PDMP reviewed: Pancho Garcia MD on 6/27/2025  6:45 AM  Is the patient prescribed a combination of a benzodiazepine and opioid? No  I have personally reviewed the OARRS report for Jalen Blackwell.   I have considered the risks of abuse, dependence, addiction and diversion.    I believe that it is clinically appropriate for Jalen Blackwell to be prescribed this medication.    Last Urine Drug Screen: 05/27/2025  No results found for this or any previous visit (from the past 8760 hours).    Controlled Substance Agreement: 04/24/2025  Reviewed Controlled Substance Agreement, including but not limited to:  Benefits, risks, and alternatives to treatment with a controlled substance medication(s).    Prescribed Controlled Substances:  > Benzodiazepines: Clonazepam  What is the patient's goal of therapy? Reduced anxiety and spasticity  Is this being achieved with current treatment? Yes  Activities of Daily Living:   Is your overall impression that this patient is benefiting (symptom reduction outweighs side effects) from benzodiazepine therapy? Yes   1. Physical Functioning: Better  2. Family Relationship: Better  3. Social Relationship: Same  4. Mood: Better  5. Sleep Patterns: Same  6. Overall Function: Better     MEDICATION HISTORY  Aripiprazole - excellent response but  caused dystonia  Brexpiprazole - caused TD  Fluoxetine - up to 60mg/day with good response in the past  Paroxetine - up to 60mg/day  Ingrezza - caused Parkinsonism  Sinemet - to address Parkinsonian effects of VMAT2 inhibitor  Vortioxetine - no clear benefit    CURRENT MEDICATIONS  Medications Prior to Visit[1]    SOCIAL HISTORY  Living situation Lives with family   Provider agency None currently   Work or day program None currently   School [employer]   Guardianship Mother (Jael)   SSA ?   Bx Specialist ?   Nicotine None   Alcohol None   Other drugs None     OBJECTIVE    Lab Results   Component Value Date    HGB 15.1 10/16/2024     10/16/2024    NEUTROABS 7.33 02/04/2023    GLUCOSE 93 10/16/2024     10/16/2024    K 4.0 10/16/2024    CO2 31 10/16/2024    CALCIUM 9.1 10/16/2024    CREATININE 0.87 10/16/2024    AST 20 10/16/2024    ALT 13 10/16/2024    AMMONIA 29 07/06/2021    TSH 0.63 10/16/2024    CHOL 238 (H) 10/16/2024    LDLF 127 (H) 01/12/2018    TRIG 134 10/16/2024    [id number] 667 06/17/2022     Therapeutic Drug Monitoring  N/A    Electrocardiograms  07/07/2021: NSR, VR 80, QTc 426    MENTAL STATUS EXAM  General/Appearance: Appropriate dress/hygiene/grooming. Sitting on the couch.  Attitude/Behavior: Average eye contact. Superficially cooperative.  Speech/Communication: Says hello. Answers a few questions.  Motor: Less spasticity/dystonia.   Gait: Not assessed at this visit.  Mood: Irritated.  Affect: Congruent.  Thought processes: Ovalo.  Thought content: Gets upset with mom because she sat on the TV remote and accidentally disrupted his show.  Perception: Does not appear to be experiencing or responding to hallucinations.  Sensorium/Cognition: Oriented to self and surroundings. Intellectual impairment.  Memory: Not directly assessed at this time.  Insight: Minimal.  Judgment: Impaired.    ASSESSMENT  I agree with mom that it now seems much less likely that the fluoxetine was  "implicated in his persistent \"dystonia,\" as these may have been more related to an underlying or co-occurring catatonia.  I think it would be very reasonable cautiously re-start the fluoxetine to help address mood and OCD symptoms, particularly now that he will be transitioning to maintenance ECT therapy.  We also discussed the possibility of trying vilazodone, which has a somewhat different or \"combined\" mechanism of action that may be helpful for his depression and OCD.    PROBLEM LIST  Intellectual developmental disorder, mild, secondary to Down syndrome  OCD  Depression  Catatonia  H/O tardive dystonia and tardive dyskinesia    PLAN  -- Resume fluoxetine 10mg QAM for mood and OCD  -- Continue ondansetron 4mg daily for OCD  -- Continue clonazepam 1mg TID for anxiety, OCD, and dystonia   -- Follow up 2 months    Pancho Garcia MD    Preparing to see the patient on date of patient encounter 10 minutes   Obtaining and/or reviewing separately obtained history  minutes   Performing a medically appropriate examination and/or evaluation 35 minutes   Counseling & educating the patient/family/caregiver  minutes   Ordering medications, tests, or procedures  minutes   Referring & communicating with other healthcare professionals  minutes   Care coordination  minutes   Independently interpreting results &   communicating results to the patient/family/caregiver  minutes   Documenting clinical information 5 minutes   Total time 50 minutes          [1]   Outpatient Medications Prior to Visit   Medication Sig Dispense Refill    clobetasol (Temovate) 0.05 % external solution Apply 0.05 Applications topically 2 times a day.      clonazePAM (KlonoPIN) 1 mg tablet Take 1 tablet (1 mg) by mouth 3 times a day. 90 tablet 2    cyanocobalamin (Vitamin B-12) 250 mcg tablet TAKE 1 TABLET BY MOUTH EVERY DAY AS DIRECTED 90 tablet 3    docusate sodium (Colace) 50 mg/5 mL oral liquid Take 5 mL (50 mg) by mouth once daily. 473 mL 5    " doxycycline (Vibramycin) 100 mg capsule Take 1 capsule (100 mg) by mouth once daily. (Patient not taking: Reported on 6/13/2025)      famotidine (Pepcid) 20 mg tablet TAKE 1 TABLET BY MOUTH EVERY DAY AT BEDTIME AS NEEDED 90 tablet 1    hydrocortisone 2.5 % cream Apply topically 2 times a day. to affected area      ketoconazole (NIZOral) 2 % shampoo APPLY TO SCALP AS DIRECTED.      levothyroxine (Synthroid, Levoxyl) 112 mcg tablet Take 1 tablet (112 mcg) by mouth early in the morning.. Take on an empty stomach at the same time each day, either 30 to 60 minutes prior to breakfast 90 tablet 1    melatonin 5 mg tablet Take 1 tablet (5 mg) by mouth once daily at bedtime. (Patient not taking: Reported on 6/13/2025)      midodrine (Proamatine) 10 mg tablet Take 1 tablet (10 mg) by mouth 3 times a day.      multivitamin (Daily-Ricardo, with folic acid,) tablet Take 1 tablet by mouth once daily. 90 tablet 3    omeprazole (PriLOSEC) 40 mg DR capsule TAKE 1 CAPSULE BY MOUTH ONCE DAILY 90 capsule 2    ondansetron (Zofran) 4 mg tablet Take 1 tablet (4 mg) by mouth once daily. 30 tablet 2    psyllium (Metamucil) powder Take 2 teaspoons in 8 oz. Liquid daily in the morning- one month supply      rosuvastatin (Crestor) 5 mg tablet TAKE 1 TABLET BY MOUTH EVERY DAY 90 tablet 1    sodium chloride 1,000 mg tablet Take 1 tablet (1 g) by mouth 2 times a day.      triamcinolone (Kenalog) 0.1 % cream Apply 1 Film topically 2 times a day.      Vitamin D3 25 mcg (1,000 unit) tablet TAKE 1 TABLET BY MOUTH EVERY DAY 90 tablet 2     No facility-administered medications prior to visit.

## 2025-06-28 RX ORDER — CLONAZEPAM 1 MG/1
1 TABLET ORAL 3 TIMES DAILY
Qty: 90 TABLET | Refills: 1 | Status: SHIPPED | OUTPATIENT
Start: 2025-06-28

## 2025-06-28 RX ORDER — ONDANSETRON 4 MG/1
4 TABLET, FILM COATED ORAL DAILY
Qty: 30 TABLET | Refills: 1 | Status: SHIPPED | OUTPATIENT
Start: 2025-06-28

## 2025-06-28 RX ORDER — FLUOXETINE 10 MG/1
10 CAPSULE ORAL EVERY MORNING
Qty: 30 CAPSULE | Refills: 1 | Status: SHIPPED | OUTPATIENT
Start: 2025-06-28

## 2025-07-11 DIAGNOSIS — R79.89 LOW VITAMIN D LEVEL: ICD-10-CM

## 2025-07-14 RX ORDER — CHOLECALCIFEROL (VITAMIN D3) 25 MCG
25 TABLET ORAL DAILY
Qty: 90 TABLET | Refills: 1 | Status: SHIPPED | OUTPATIENT
Start: 2025-07-14

## 2025-07-18 ENCOUNTER — APPOINTMENT (OUTPATIENT)
Dept: SPEECH THERAPY | Facility: CLINIC | Age: 50
End: 2025-07-18
Payer: MEDICARE

## 2025-07-21 DIAGNOSIS — F32.1 CURRENT MODERATE EPISODE OF MAJOR DEPRESSIVE DISORDER, UNSPECIFIED WHETHER RECURRENT (MULTI): ICD-10-CM

## 2025-07-21 DIAGNOSIS — F42.2 MIXED OBSESSIONAL THOUGHTS AND ACTS: ICD-10-CM

## 2025-07-21 RX ORDER — FLUOXETINE 10 MG/1
10 CAPSULE ORAL EVERY MORNING
Qty: 90 CAPSULE | Refills: 0 | Status: SHIPPED | OUTPATIENT
Start: 2025-07-21

## 2025-07-31 NOTE — PROGRESS NOTES
Speech-Language Pathology    SLP Adult Outpatient Speech-Language Pathology Clinical Swallow Evaluation    Patient Name: Jalen Blackwell  MRN: [id number]  Today's Date: 8/1/2025   Time Calculation  Start Time: 1400  Stop Time: 1500  Time Calculation (min): 60 min      Current Problem:        ICD-10-CM    Oropharyngeal dysphagia R13.12    Relevant Orders    Follow Up In Speech Therapy        Recommendations:  Risk for Aspiration: Yes  Solid Diet Recommendations : Regular solids (IDDSI Level 7) cut into bite-sized pieces with added moisture as needed (avoid select dry/tough foods)  Liquid Diet Recommendations: Thin liquids (IDDSI Level 0) via Provale cup (10cc)  Medication Recommendations: Crushed in puree  Compensatory Swallowing Strategies: Small/single bites/sips, slow rate of intake, upright at 90 degrees during all PO intake, alternate bites/sips, no straws        Assessment:  Assessment Results:    This date, patient presented with oropharyngeal dysphagia as was revealed on his MBS 6/13/2025. Patient has a h/o aspiration PNA. Skilled speech therapy is medically necessary and ordered by a physician to address diet monitoring, safe swallow strategy training, pharyngeal strengthening exercises, and establishment of a HEP. Without skilled intervention, patient is at risk for adverse health effects.    Prognosis: Good  Treatment Provided: Yes, therapist provided instruction on the following:  Pharyngeal strengthening exercises: Effortful swallow, CTAR (isokinetic, isometric)  Treatment Tolerance: Good  Strengths: Caregiver support        Plan:  Inpatient/Swing Bed or Outpatient: Outpatient  SLP Plan: Skilled SLP  SLP Frequency: 1-2x/week  Duration: 8 weeks  Discussed POC: Patient, parents/guardians  Discussed Risks/Benefits: Yes, Patient, parents/guardians  Patient/Caregiver Agreeable: Yes    GOALS: Start date: 8/1/2025; anticipated end/re-eval date: 10/30/2025  By discharge:  LTG: Patient will tolerate the least  restrictive diet without clinical s/s aspiration while maintaining adequate nutrition/hydration.    STGs:  1. Patient will tolerate the least restrictive diet without clinical s/s aspiration with 95% of PO while maintaining adequate nutrition/hydration.  2. Patient will utilize recommended safe swallow strategies with 90% of PO consumption opportunities given mild verbal and/or visual cueing.  3. Patient will perform pharyngeal/laryngeal strengthening exercises with 90% accuracy given moderate verbal and/or visual cueing.  4. Family will demonstrate understanding of education related to dysphagia and management independently.        General Visit Information:  Reason for Referral:    Patient was referred for outpatient Speech Therapy evaluation 2/2 dysphagia. He attended the session accompanied by his parents who provided details of patient's medical history. Patient was referred for therapy following 6/13/2025 MBS (see details below) to monitor diet tolerance and safe swallow strategy application as well as to initiate pharyngeal strengthening exercises and develop a HEP. Today, his mother reported that he had an esophageal dilation on 7/8/2025 that seems to have helped his overall condition. He is generally doing well with use of the recommended Provale cup, however, he is frustrated with its use as he desires more than the cup will administer to him at one time, and his mother has noticed some coughing with it recently (may be lingering cough from a recent cold).    *Social Hx: Lives with parents    Referred By: Dr. Raul Childs  Past Medical History Relevant to Rehab: Oropharyngeal dysphagia, GERD, Down Syndrome, LAUREL, HLD  Certification Period Start Date: 8/1/2025  Certification Period End Date: 10/30/2025  Number of Authorized Treatments : Based on [place] ([facility])  Total Number of Visits : 1        Objective     Baseline Assessment:  Respiratory Status: Room air  Behavior/Cognition: Alert, Cooperative, Pleasant  "mood  Vision: WFL  Patient Positioning: Upright in Chair  Baseline Vocal Quality: WFL  Volitional Swallow: WFL      Pain:  Pain Assessment: 0-10  Pain Score: 0 - No pain      Oral/Motor Assessment:  Oral Hygiene: Good  Dentition: Adequate  Oral Motor: Within Functional Limits      [facility] [place]  \"SPEECH FINDINGS:  Reason for Referral: Recommended by pulmonologist due to bilateral  infiltrates detected on chest CT. Mom reporting that he seems to  cough more when eating. Patient Hx: Down syndrome, oropharyngeal  dysphagia, GERD, syncope and collapse, and tardive dystonia.  Respiratory Status: Room air Current diet: Regular/thin cut into  bite-sized pieces with extra moisture added as needed; parents avoid  select dry/tough foods      Pain:  Pain Scale: 0-10  Ratin      FINAL SPEECH RECOMMENDATIONS      DIET:  - Regular/thin cut into bite-sized pieces with extra moisture added  as needed; continue to avoid select dry/tough foods - Thin liquids  (IDDSI Level 0) VIA PROVALE CUP (10cc) -Medications: Crushed in puree      STRATEGIES:  - Upright positioning for all PO intake  - Slow rate of intake  - Small/SINGLE sips  - One bite/sip at a time  - Alternate bites and sips  -All liquids via Provale cup 10cc  -No straws      SLP PLAN:  Skilled SLP Services: Skilled SLP intervention for dysphagia is  warranted. SLP Frequency: To be determined by treating SLP in  outpatient setting (recommend 3x/week if able due to poor compliance  with home exercise program; parents report that pt cooperates with  therapists but does not follow through when cued by parents)  Duration: to be determined by outpatient SLP Treatment/Interventions:  - Simple oropharyngeal exercises (e.g., EMST, CTAR, Shaker, jaw  opening against resistance, effortful swallow) - Bolus trials  - Compensatory strategy training  - Diet tolerance/advancement  - Patient/caregiver education      Discussed POC: Patient  Discussed Risks/Benefits: Yes  Patient/Caregiver " Agreeable: Yes      Short term goals established 06/24/25:  - Pt will consume prescribed diet (current diet is regular with  modifications/thin) without overt s/sx aspiration/penetration in 95%  of observed trials. - Pt will demonstrate follow-through of trained  compensatory strategies during a meal/snack with 90% acc given min  cues. - Pt will complete oral/pharyngeal/laryngeal strengthening  exercises as directed given mod cues from SLP in order to improve  swallow function. - Pt/family will demonstrate understanding of  education related to dysphagia independently.      Long term goals established 06/24/25:  Pt will meet 100% of nutrition/hydration needs by mouth, via least  restrictive diet, without pulmonary compromise.          Education Provided: Pt's mom was educated re: role of ST, results of  assessment, risk for aspiration, and recommended safe swallow  strategies. She stated intent to purchase Provale cup to regulate sip  size.      Additional Medical Consults Suggested:  - GI      Repeat study/ dc plan: Repeat MBSS as clinically indicated.          Mechanics of the Swallow Summary:  ORAL PHASE:  Lip Closure - Escape progressing to mid-chin  Tongue Control During Bolus Hold - Posterior escape of greater than  half of the bolus Bolus prep/mastication - Slow prolonged mastication  with complete re-collection necessary Bolus transport/lingual motion  - Repetitive/disorganized tongue motion (tongue pumping) Oral residue  - Residue collection on oral structure (piecemeal deglutition);  reduced to trace amount with spontaneous reswallows)      PHARYNGEAL PHASE:  Initiation of pharyngeal swallow - Collection of bolus at the level  of the pyriform sinus Soft palate elevation - Trace column of  contrast or air between soft palate and pharyngeal wall Laryngeal  elevation - Complete superior movement of thyroid cartilage with  contact of arytenoids to epiglottic petiole Anterior hyoid excursion  - Partial anterior  movement Epiglottic movement - Partial inversion  Laryngeal vestibule closure - Incomplete - narrow column of  air/contrast in laryngeal vestibule Pharyngeal stripping wave -  Present, however, diminished Pharyngeal contraction (A/P view) - Not  tested Pharyngoesophageal segment opening - Minimal  distension/incomplete duration with marked obstruction of flow of  bolus (most notable with regular solid bolus) Tongue base retraction  - Wide column of contrast or air between tongue base and pharyngeal  wall Pharyngeal residue - Majority of contrast within or on the  pharyngeal structures      ESOPHAGEAL PHASE:  Esophageal clearance - Not evaluated          SLP Impressions with Severity Rating:  Pt presents with moderate oropharyngeal dysphagia upon completion of  modified barium swallow study this date. Swallowing physiology is  detailed above. Impairments most impacting swallowing safety and  efficiency include delayed initiation of pharyngeal swallow,  diminished pharyngeal stripping wave, reduced UES opening, and  reduced tongue base retraction. Pt impulsively took in larger boluses  intermittently causing an increased risk for penetration/aspiration.  Patient demonstrated intermittent penetration of thin liquids and  transient silent aspiration of thin liquids to the underside of the  vocal folds x1 that cleared. Pt demonstrated penetration of mildly  think liquid during 20 mL when head was tilted back which caused  premature spillage to the pyriforms. No further penetration or  aspiration was visualized during study. Large amounts of pharyngeal  residue was noted (especially after solid boluses) that could  increase risk for penetration/aspiration. Liquid wash was effective  in reducing but not fully clearing pharyngeal residue.      Intermittent premature closure of the UES obstructed majority of the  bolus, mostly noted during  solid boluses. Pt may benefit from  consideration of dilation of UES.         "  OUTCOME MEASURES:  Functional Oral Intake Scale  Functional Oral Intake Scale: Level 6        total oral diet with  multiple consistencies without special preparations but specific food  limitations          Eating Assessment Tool (EAT-10)  0=No problem, 1=Mild problem, 2=Mild to moderate problem, 3=Moderate  problem, 4=Severe problem      EAT 10  My swallowing problem has caused me to lose weight.: 0  My swallowing problem interferes with my ability to go out for  meals.: 0 Swallowing liquids takes extra effort.: 0  Swallowing solids takes extra effort.: 0  Swallowing pills takes extra effort.: 0  Swallowing is painful: 0  The pleasure of eating is affected by my swallowing.: 0  When I swallow food sticks in my throat.: 0  I cough when I eat.: 2  Swallowing is stressful: 0  EAT-10 TOTAL SCORE:: 2      A total score of 3 or above may indicate difficulty with swallowing  safely and/or efficiently          Rosenbek's Penetration Aspiration Scale  Thin Liquids: 6. ASPIRATION that CLEARS with the swallow - contrast  passes glottis, no subglottic residue During the Swallow  Nectar Thick Liquids: 3. PENETRATION with LOW ASPIRATION risk -  contrast remains above vocal cords, visible residue During the Swallow  After the Swallow  Puree: 1. NO ASPIRATION & NO PENETRATION - no aspiration, contrast  does not enter airway Solids: 1. NO ASPIRATION & NO PENETRATION -  no aspiration, contrast does not enter airway\"     EAT 10:  My swallowing problem has caused me to lose weight: 0  My swallowing problem interferes with my ability to go out for  meals: 0   Swallowing liquids takes extra effort: 0  Swallowing solids takes extra effort: 0  Swallowing pills takes extra effort: 0  Swallowing is painful: 0  The pleasure of eating is affected by my swallowin  When I swallow food sticks in my throat: 0  I cough when I eat: 2  Swallowing is stressful: 0  EAT-10 TOTAL SCORE: 2      [A total score of 3 or above may indicate difficulty " with swallowing  safely and/or efficiently]        Outpatient Education:  Individual(s) Educated: Patient, parents/guardians  Written Education : Pharyngeal strengthening exercises; exercise tracking form  Verbal Education : Results of evaluation; treatment recommendations; pharyngeal strengthening exercises  Risk and Benefits Discussed with Patient/Caregiver/Other: yes  Patient/Caregiver Demonstrated Understanding: yes  Plan of Care Discussed and Agreed Upon: yes  Patient Response to Education: Patient/Caregiver Verbalized Understanding of Information, Patient/Caregiver Asked Appropriate Questions

## 2025-08-01 ENCOUNTER — EVALUATION (OUTPATIENT)
Dept: SPEECH THERAPY | Facility: CLINIC | Age: 50
End: 2025-08-01
Payer: MEDICARE

## 2025-08-01 DIAGNOSIS — R13.12 OROPHARYNGEAL DYSPHAGIA: ICD-10-CM

## 2025-08-01 PROCEDURE — 92526 ORAL FUNCTION THERAPY: CPT | Mod: GN

## 2025-08-01 PROCEDURE — 92610 EVALUATE SWALLOWING FUNCTION: CPT | Mod: GN

## 2025-08-03 DIAGNOSIS — K21.9 GASTROESOPHAGEAL REFLUX DISEASE WITHOUT ESOPHAGITIS: ICD-10-CM

## 2025-08-04 RX ORDER — OMEPRAZOLE 40 MG/1
40 CAPSULE, DELAYED RELEASE ORAL DAILY
Qty: 90 CAPSULE | Refills: 1 | Status: SHIPPED | OUTPATIENT
Start: 2025-08-04

## 2025-08-09 LAB
BASOPHILS # BLD AUTO: 89 CELLS/UL (ref 0–200)
BASOPHILS NFR BLD AUTO: 1.9 %
CHOLEST SERPL-MCNC: 153 MG/DL
CHOLEST/HDLC SERPL: 2.9 (CALC)
EOSINOPHIL # BLD AUTO: 99 CELLS/UL (ref 15–500)
EOSINOPHIL NFR BLD AUTO: 2.1 %
ERYTHROCYTE [DISTWIDTH] IN BLOOD BY AUTOMATED COUNT: 12.4 % (ref 11–15)
HCT VFR BLD AUTO: 43.6 % (ref 38.5–50)
HDLC SERPL-MCNC: 52 MG/DL
HGB BLD-MCNC: 14.5 G/DL (ref 13.2–17.1)
LDLC SERPL CALC-MCNC: 83 MG/DL (CALC)
LYMPHOCYTES # BLD AUTO: 2134 CELLS/UL (ref 850–3900)
LYMPHOCYTES NFR BLD AUTO: 45.4 %
MCH RBC QN AUTO: 32.8 PG (ref 27–33)
MCHC RBC AUTO-ENTMCNC: 33.3 G/DL (ref 32–36)
MCV RBC AUTO: 98.6 FL (ref 80–100)
MONOCYTES # BLD AUTO: 381 CELLS/UL (ref 200–950)
MONOCYTES NFR BLD AUTO: 8.1 %
NEUTROPHILS # BLD AUTO: 1998 CELLS/UL (ref 1500–7800)
NEUTROPHILS NFR BLD AUTO: 42.5 %
NONHDLC SERPL-MCNC: 101 MG/DL (CALC)
PLATELET # BLD AUTO: 126 THOUSAND/UL (ref 140–400)
PMV BLD REES-ECKER: 10 FL (ref 7.5–12.5)
RBC # BLD AUTO: 4.42 MILLION/UL (ref 4.2–5.8)
SERVICE CMNT-IMP: ABNORMAL
TRIGL SERPL-MCNC: 88 MG/DL
TSH SERPL-ACNC: 0.46 MIU/L (ref 0.4–4.5)
WBC # BLD AUTO: 4.7 THOUSAND/UL (ref 3.8–10.8)

## 2025-08-12 ENCOUNTER — APPOINTMENT (OUTPATIENT)
Dept: SPEECH THERAPY | Facility: CLINIC | Age: 50
End: 2025-08-12
Payer: MEDICARE

## 2025-08-12 DIAGNOSIS — D69.6 THROMBOCYTOPENIA: ICD-10-CM

## 2025-08-12 DIAGNOSIS — E78.5 HYPERLIPIDEMIA, UNSPECIFIED HYPERLIPIDEMIA TYPE: Primary | ICD-10-CM

## 2025-08-12 DIAGNOSIS — E03.9 HYPOTHYROIDISM, UNSPECIFIED TYPE: ICD-10-CM

## 2025-08-15 ENCOUNTER — APPOINTMENT (OUTPATIENT)
Dept: BEHAVIORAL HEALTH | Facility: CLINIC | Age: 50
End: 2025-08-15
Payer: MEDICARE

## 2025-08-15 DIAGNOSIS — F32.1 CURRENT MODERATE EPISODE OF MAJOR DEPRESSIVE DISORDER, UNSPECIFIED WHETHER RECURRENT (MULTI): Primary | ICD-10-CM

## 2025-08-15 DIAGNOSIS — F42.2 MIXED OBSESSIONAL THOUGHTS AND ACTS: ICD-10-CM

## 2025-08-15 DIAGNOSIS — Q90.9 DOWN SYNDROME: ICD-10-CM

## 2025-08-15 DIAGNOSIS — F06.1 CATATONIA: ICD-10-CM

## 2025-08-15 DIAGNOSIS — F79 INTELLECTUAL DISABILITY: ICD-10-CM

## 2025-08-15 PROCEDURE — 99215 OFFICE O/P EST HI 40 MIN: CPT | Performed by: PSYCHIATRY & NEUROLOGY

## 2025-08-15 PROCEDURE — G2211 COMPLEX E/M VISIT ADD ON: HCPCS | Performed by: PSYCHIATRY & NEUROLOGY

## 2025-08-15 PROCEDURE — G2212 PROLONG OUTPT/OFFICE VIS: HCPCS | Performed by: PSYCHIATRY & NEUROLOGY

## 2025-08-15 RX ORDER — FLUOXETINE HYDROCHLORIDE 40 MG/1
40 CAPSULE ORAL EVERY MORNING
Qty: 90 CAPSULE | Refills: 0 | Status: SHIPPED | OUTPATIENT
Start: 2025-08-15

## 2025-08-15 RX ORDER — FLUOXETINE 10 MG/1
30 CAPSULE ORAL EVERY MORNING
Qty: 42 CAPSULE | Refills: 0 | Status: SHIPPED | OUTPATIENT
Start: 2025-08-15 | End: 2025-08-29

## 2025-08-15 ASSESSMENT — ENCOUNTER SYMPTOMS
DYSPHORIC MOOD: 1
ABDOMINAL PAIN: 0
WEAKNESS: 1
CHOKING: 0
ACTIVITY CHANGE: 0
NECK STIFFNESS: 1
VOMITING: 0
SLEEP DISTURBANCE: 0
EYE PAIN: 0
SEIZURES: 0
DIARRHEA: 0
CONSTIPATION: 0

## 2025-08-16 RX ORDER — ONDANSETRON 4 MG/1
4 TABLET, FILM COATED ORAL DAILY
Qty: 30 TABLET | Refills: 1 | Status: SHIPPED | OUTPATIENT
Start: 2025-08-16

## 2025-08-16 ASSESSMENT — ENCOUNTER SYMPTOMS: TROUBLE SWALLOWING: 1

## 2025-08-27 ENCOUNTER — APPOINTMENT (OUTPATIENT)
Dept: SPEECH THERAPY | Facility: CLINIC | Age: 50
End: 2025-08-27
Payer: MEDICARE

## 2025-08-28 ENCOUNTER — APPOINTMENT (OUTPATIENT)
Dept: SPEECH THERAPY | Facility: CLINIC | Age: 50
End: 2025-08-28
Payer: MEDICARE

## 2025-10-06 ENCOUNTER — APPOINTMENT (OUTPATIENT)
Dept: BEHAVIORAL HEALTH | Facility: CLINIC | Age: 50
End: 2025-10-06
Payer: MEDICARE

## 2025-12-12 ENCOUNTER — APPOINTMENT (OUTPATIENT)
Dept: PRIMARY CARE | Facility: CLINIC | Age: 50
End: 2025-12-12
Payer: MEDICARE